# Patient Record
Sex: MALE | Race: WHITE
[De-identification: names, ages, dates, MRNs, and addresses within clinical notes are randomized per-mention and may not be internally consistent; named-entity substitution may affect disease eponyms.]

---

## 2022-12-27 ENCOUNTER — HOSPITAL ENCOUNTER (INPATIENT)
Dept: HOSPITAL 95 - ER | Age: 82
LOS: 2 days | Discharge: HOME HEALTH | DRG: 193 | End: 2022-12-29
Attending: HOSPITALIST | Admitting: HOSPITALIST
Payer: OTHER GOVERNMENT

## 2022-12-27 VITALS — WEIGHT: 203.93 LBS | BODY MASS INDEX: 29.19 KG/M2 | HEIGHT: 70 IN

## 2022-12-27 DIAGNOSIS — I12.9: ICD-10-CM

## 2022-12-27 DIAGNOSIS — I48.0: ICD-10-CM

## 2022-12-27 DIAGNOSIS — J70.1: ICD-10-CM

## 2022-12-27 DIAGNOSIS — J44.0: ICD-10-CM

## 2022-12-27 DIAGNOSIS — M19.90: ICD-10-CM

## 2022-12-27 DIAGNOSIS — Z87.891: ICD-10-CM

## 2022-12-27 DIAGNOSIS — J10.00: Primary | ICD-10-CM
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
## 2022-12-27 DIAGNOSIS — J96.01: ICD-10-CM

## 2022-12-27 DIAGNOSIS — N18.4: ICD-10-CM

## 2022-12-27 DIAGNOSIS — Z20.822: ICD-10-CM

## 2022-12-27 DIAGNOSIS — E66.9: ICD-10-CM

## 2022-12-27 DIAGNOSIS — C34.11: ICD-10-CM

## 2022-12-27 DIAGNOSIS — M10.9: ICD-10-CM

## 2022-12-27 DIAGNOSIS — G25.81: ICD-10-CM

## 2022-12-27 DIAGNOSIS — Z66: ICD-10-CM

## 2022-12-27 DIAGNOSIS — Z79.82: ICD-10-CM

## 2022-12-27 DIAGNOSIS — E78.5: ICD-10-CM

## 2022-12-27 LAB
ALBUMIN SERPL BCP-MCNC: 3 G/DL (ref 3.4–5)
ALBUMIN/GLOB SERPL: 0.8 {RATIO} (ref 0.8–1.8)
ALT SERPL W P-5'-P-CCNC: 11 U/L (ref 12–78)
ANION GAP SERPL CALCULATED.4IONS-SCNC: 5 MMOL/L (ref 6–16)
AST SERPL W P-5'-P-CCNC: 18 U/L (ref 12–37)
BASOPHILS # BLD AUTO: 0.02 K/MM3 (ref 0–0.23)
BASOPHILS NFR BLD AUTO: 0 % (ref 0–2)
BILIRUB SERPL-MCNC: 0.4 MG/DL (ref 0.1–1)
BUN SERPL-MCNC: 52 MG/DL (ref 8–24)
CALCIUM SERPL-MCNC: 8.3 MG/DL (ref 8.5–10.1)
CHLORIDE SERPL-SCNC: 114 MMOL/L (ref 98–108)
CO2 SERPL-SCNC: 23 MMOL/L (ref 21–32)
CREAT SERPL-MCNC: 3.4 MG/DL (ref 0.6–1.2)
DEPRECATED RDW RBC AUTO: 62.4 FL (ref 35.1–46.3)
EOSINOPHIL # BLD AUTO: 0.06 K/MM3 (ref 0–0.68)
EOSINOPHIL NFR BLD AUTO: 1 % (ref 0–6)
ERYTHROCYTE [DISTWIDTH] IN BLOOD BY AUTOMATED COUNT: 18.3 % (ref 11.7–14.2)
FLUAV RNA SPEC QL NAA+PROBE: POSITIVE
FLUBV RNA SPEC QL NAA+PROBE: NEGATIVE
GLOBULIN SER CALC-MCNC: 3.9 G/DL (ref 2.2–4)
GLUCOSE SERPL-MCNC: 127 MG/DL (ref 70–99)
HCT VFR BLD AUTO: 31.5 % (ref 37–53)
HGB BLD-MCNC: 9.4 G/DL (ref 13.5–17.5)
IMM GRANULOCYTES # BLD AUTO: 0.01 K/MM3 (ref 0–0.1)
IMM GRANULOCYTES NFR BLD AUTO: 0 % (ref 0–1)
LYMPHOCYTES # BLD AUTO: 1.12 K/MM3 (ref 0.84–5.2)
LYMPHOCYTES NFR BLD AUTO: 24 % (ref 21–46)
MCHC RBC AUTO-ENTMCNC: 29.8 G/DL (ref 31.5–36.5)
MCV RBC AUTO: 92 FL (ref 80–100)
MONOCYTES # BLD AUTO: 0.32 K/MM3 (ref 0.16–1.47)
MONOCYTES NFR BLD AUTO: 7 % (ref 4–13)
NEUTROPHILS # BLD AUTO: 3.17 K/MM3 (ref 1.96–9.15)
NEUTROPHILS NFR BLD AUTO: 68 % (ref 41–73)
NRBC # BLD AUTO: 0 K/MM3 (ref 0–0.02)
NRBC BLD AUTO-RTO: 0 /100 WBC (ref 0–0.2)
PLATELET # BLD AUTO: 172 K/MM3 (ref 150–400)
POTASSIUM SERPL-SCNC: 4.7 MMOL/L (ref 3.5–5.5)
PROT SERPL-MCNC: 6.9 G/DL (ref 6.4–8.2)
RSV RNA SPEC QL NAA+PROBE: NEGATIVE
SARS-COV-2 RNA RESP QL NAA+PROBE: NEGATIVE
SODIUM SERPL-SCNC: 142 MMOL/L (ref 136–145)

## 2022-12-27 PROCEDURE — G0008 ADMIN INFLUENZA VIRUS VAC: HCPCS

## 2022-12-27 PROCEDURE — G0378 HOSPITAL OBSERVATION PER HR: HCPCS

## 2022-12-27 PROCEDURE — A9270 NON-COVERED ITEM OR SERVICE: HCPCS

## 2022-12-28 LAB
ANION GAP SERPL CALCULATED.4IONS-SCNC: 6 MMOL/L (ref 6–16)
BASOPHILS # BLD AUTO: 0.02 K/MM3 (ref 0–0.23)
BASOPHILS NFR BLD AUTO: 1 % (ref 0–2)
BUN SERPL-MCNC: 49 MG/DL (ref 8–24)
CALCIUM SERPL-MCNC: 8.3 MG/DL (ref 8.5–10.1)
CHLORIDE SERPL-SCNC: 113 MMOL/L (ref 98–108)
CO2 SERPL-SCNC: 25 MMOL/L (ref 21–32)
CREAT SERPL-MCNC: 3.34 MG/DL (ref 0.6–1.2)
DEPRECATED RDW RBC AUTO: 59.7 FL (ref 35.1–46.3)
EOSINOPHIL # BLD AUTO: 0.05 K/MM3 (ref 0–0.68)
EOSINOPHIL NFR BLD AUTO: 1 % (ref 0–6)
ERYTHROCYTE [DISTWIDTH] IN BLOOD BY AUTOMATED COUNT: 18 % (ref 11.7–14.2)
GLUCOSE SERPL-MCNC: 108 MG/DL (ref 70–99)
HCT VFR BLD AUTO: 27.8 % (ref 37–53)
HGB BLD-MCNC: 8.5 G/DL (ref 13.5–17.5)
IMM GRANULOCYTES # BLD AUTO: 0.02 K/MM3 (ref 0–0.1)
IMM GRANULOCYTES NFR BLD AUTO: 1 % (ref 0–1)
LYMPHOCYTES # BLD AUTO: 0.95 K/MM3 (ref 0.84–5.2)
LYMPHOCYTES NFR BLD AUTO: 27 % (ref 21–46)
MCHC RBC AUTO-ENTMCNC: 30.6 G/DL (ref 31.5–36.5)
MCV RBC AUTO: 90 FL (ref 80–100)
MONOCYTES # BLD AUTO: 0.27 K/MM3 (ref 0.16–1.47)
MONOCYTES NFR BLD AUTO: 8 % (ref 4–13)
NEUTROPHILS # BLD AUTO: 2.27 K/MM3 (ref 1.96–9.15)
NEUTROPHILS NFR BLD AUTO: 63 % (ref 41–73)
NRBC # BLD AUTO: 0 K/MM3 (ref 0–0.02)
NRBC BLD AUTO-RTO: 0 /100 WBC (ref 0–0.2)
PLATELET # BLD AUTO: 177 K/MM3 (ref 150–400)
POTASSIUM SERPL-SCNC: 4.4 MMOL/L (ref 3.5–5.5)
SODIUM SERPL-SCNC: 144 MMOL/L (ref 136–145)

## 2022-12-28 NOTE — NUR
DAY SHIFT SUMMARY
 
PT ORIENTED X4, VSS PER PT TREND. ON 2-8L PER RT HOME EVAL. 2L AT REST AND 8L
WITH ACTIVITY. PLAN TO D/C PER DR. CROWELL BUT WITH O2 REQUIREMENT PREFERS ONE
MORE DAY OF INPATIENT AND REEVAL. UP SBA.
 
WILL PASS ON TO DORIE TOLBERT

## 2022-12-28 NOTE — NUR
SHIFT SUMMARY
PT ADMITTED AT 2250-REPORT FROM ED NURSE- PT A&O X 4, PT ON 2L O2- PT DENIES
SOB, PAIN- PT STANDS AT SIDE OF BED AND USES URINAL WITHOUT PROBLEM- PT ATE
SANDWICH WITHOUT C/O NAUSEA- PT TOOK SCHEDULED MEDS WITHOUT PROBLEMS- GAVE
INFLUENZA VACCINE PER PT REQUEST- PT SLEPT MOST OF NIGHT- BED LOW POSITION,
CALL LIGHT WITHIN REACH

## 2022-12-29 LAB
ALBUMIN SERPL BCP-MCNC: 2.6 G/DL (ref 3.4–5)
ANION GAP SERPL CALCULATED.4IONS-SCNC: 6 MMOL/L (ref 6–16)
BUN SERPL-MCNC: 52 MG/DL (ref 8–24)
CALCIUM SERPL-MCNC: 8.1 MG/DL (ref 8.5–10.1)
CHLORIDE SERPL-SCNC: 111 MMOL/L (ref 98–108)
CO2 SERPL-SCNC: 26 MMOL/L (ref 21–32)
CREAT SERPL-MCNC: 3.66 MG/DL (ref 0.6–1.2)
DEPRECATED RDW RBC AUTO: 58.9 FL (ref 35.1–46.3)
ERYTHROCYTE [DISTWIDTH] IN BLOOD BY AUTOMATED COUNT: 17.7 % (ref 11.7–14.2)
GLUCOSE SERPL-MCNC: 99 MG/DL (ref 70–99)
HCT VFR BLD AUTO: 27.4 % (ref 37–53)
HGB BLD-MCNC: 8.3 G/DL (ref 13.5–17.5)
MCHC RBC AUTO-ENTMCNC: 30.3 G/DL (ref 31.5–36.5)
MCV RBC AUTO: 90 FL (ref 80–100)
NRBC # BLD AUTO: 0 K/MM3 (ref 0–0.02)
NRBC BLD AUTO-RTO: 0 /100 WBC (ref 0–0.2)
PHOSPHATE SERPL-MCNC: 3.5 MG/DL (ref 2.5–4.9)
PLATELET # BLD AUTO: 190 K/MM3 (ref 150–400)
POTASSIUM SERPL-SCNC: 4.4 MMOL/L (ref 3.5–5.5)
SODIUM SERPL-SCNC: 143 MMOL/L (ref 136–145)

## 2022-12-29 NOTE — NUR
DISCHARGE REVIEWED WITH PT AND SON. IV PULLED INTACT. NO TELE. PT VERBALIZED
UNDERSTANDING MEDS AND INST. HAS O2 AT HOME AND ALSO PORTABLE IN HAND, PT
WHEELED TODOOR AT 1437

## 2022-12-29 NOTE — NUR
SHIFT SUMMARY
 
A&O X4. VSS. DENIES PAIN. PLEASANT AND COOPERATIVE WITH CARE. STANDBY ASSIST.
FAMILY AT BEDSIDE BEGINNING OF SHIFT. CURRENTLY ON 3L NC AT REST. BED ALARM
ON. WILL CONTINUE TO MONITOR AND FOLLOW PLAN OF CARE.

## 2023-07-13 ENCOUNTER — HOSPITAL ENCOUNTER (OUTPATIENT)
Dept: HOSPITAL 95 - LAB SHORT | Age: 83
Discharge: HOME | End: 2023-07-13
Attending: INTERNAL MEDICINE
Payer: OTHER GOVERNMENT

## 2023-07-13 DIAGNOSIS — R10.9: ICD-10-CM

## 2023-07-13 DIAGNOSIS — T85.71XA: ICD-10-CM

## 2023-07-13 DIAGNOSIS — N18.6: Primary | ICD-10-CM

## 2023-07-19 ENCOUNTER — HOSPITAL ENCOUNTER (INPATIENT)
Dept: HOSPITAL 95 - ER | Age: 83
LOS: 10 days | Discharge: HOME HEALTH | DRG: 907 | End: 2023-07-29
Attending: HOSPITALIST | Admitting: HOSPITALIST
Payer: OTHER GOVERNMENT

## 2023-07-19 VITALS — DIASTOLIC BLOOD PRESSURE: 64 MMHG | SYSTOLIC BLOOD PRESSURE: 135 MMHG

## 2023-07-19 VITALS — BODY MASS INDEX: 27.77 KG/M2 | WEIGHT: 194.01 LBS | HEIGHT: 70 IN

## 2023-07-19 DIAGNOSIS — Z79.82: ICD-10-CM

## 2023-07-19 DIAGNOSIS — Z79.899: ICD-10-CM

## 2023-07-19 DIAGNOSIS — K56.600: ICD-10-CM

## 2023-07-19 DIAGNOSIS — E86.0: ICD-10-CM

## 2023-07-19 DIAGNOSIS — Z85.118: ICD-10-CM

## 2023-07-19 DIAGNOSIS — G47.33: ICD-10-CM

## 2023-07-19 DIAGNOSIS — I48.0: ICD-10-CM

## 2023-07-19 DIAGNOSIS — A41.01: ICD-10-CM

## 2023-07-19 DIAGNOSIS — K65.1: ICD-10-CM

## 2023-07-19 DIAGNOSIS — Z71.6: ICD-10-CM

## 2023-07-19 DIAGNOSIS — K56.7: ICD-10-CM

## 2023-07-19 DIAGNOSIS — J44.9: ICD-10-CM

## 2023-07-19 DIAGNOSIS — T85.611A: ICD-10-CM

## 2023-07-19 DIAGNOSIS — M10.9: ICD-10-CM

## 2023-07-19 DIAGNOSIS — D75.838: ICD-10-CM

## 2023-07-19 DIAGNOSIS — E86.1: ICD-10-CM

## 2023-07-19 DIAGNOSIS — I12.9: ICD-10-CM

## 2023-07-19 DIAGNOSIS — K21.9: ICD-10-CM

## 2023-07-19 DIAGNOSIS — R80.0: ICD-10-CM

## 2023-07-19 DIAGNOSIS — M19.90: ICD-10-CM

## 2023-07-19 DIAGNOSIS — Z66: ICD-10-CM

## 2023-07-19 DIAGNOSIS — Z79.891: ICD-10-CM

## 2023-07-19 DIAGNOSIS — K91.89: ICD-10-CM

## 2023-07-19 DIAGNOSIS — Z99.89: ICD-10-CM

## 2023-07-19 DIAGNOSIS — D63.1: ICD-10-CM

## 2023-07-19 DIAGNOSIS — N18.5: ICD-10-CM

## 2023-07-19 DIAGNOSIS — T85.71XA: Primary | ICD-10-CM

## 2023-07-19 DIAGNOSIS — Z98.890: ICD-10-CM

## 2023-07-19 DIAGNOSIS — I25.10: ICD-10-CM

## 2023-07-19 DIAGNOSIS — E78.5: ICD-10-CM

## 2023-07-19 DIAGNOSIS — R18.8: ICD-10-CM

## 2023-07-19 DIAGNOSIS — K44.9: ICD-10-CM

## 2023-07-19 DIAGNOSIS — E87.0: ICD-10-CM

## 2023-07-19 DIAGNOSIS — G25.81: ICD-10-CM

## 2023-07-19 DIAGNOSIS — L03.311: ICD-10-CM

## 2023-07-19 DIAGNOSIS — Z87.891: ICD-10-CM

## 2023-07-19 DIAGNOSIS — K80.20: ICD-10-CM

## 2023-07-19 DIAGNOSIS — G47.00: ICD-10-CM

## 2023-07-19 DIAGNOSIS — Z99.2: ICD-10-CM

## 2023-07-19 DIAGNOSIS — R94.31: ICD-10-CM

## 2023-07-19 LAB
ALBUMIN SERPL BCP-MCNC: 2.9 G/DL (ref 3.4–5)
ALBUMIN/GLOB SERPL: 0.7 {RATIO} (ref 0.8–1.8)
ALT SERPL W P-5'-P-CCNC: 16 U/L (ref 12–78)
ANION GAP SERPL CALCULATED.4IONS-SCNC: 8 MMOL/L (ref 6–16)
AST SERPL W P-5'-P-CCNC: 15 U/L (ref 12–37)
BASOPHILS # BLD AUTO: 0.04 K/MM3 (ref 0–0.23)
BASOPHILS NFR BLD AUTO: 1 % (ref 0–2)
BILIRUB SERPL-MCNC: 0.3 MG/DL (ref 0.1–1)
BUN SERPL-MCNC: 55 MG/DL (ref 8–24)
CALCIUM SERPL-MCNC: 9.1 MG/DL (ref 8.5–10.1)
CHLORIDE SERPL-SCNC: 108 MMOL/L (ref 98–108)
CO2 SERPL-SCNC: 25 MMOL/L (ref 21–32)
CREAT SERPL-MCNC: 3.71 MG/DL (ref 0.6–1.2)
DEPRECATED RDW RBC AUTO: 59.8 FL (ref 35.1–46.3)
EOSINOPHIL # BLD AUTO: 0.16 K/MM3 (ref 0–0.68)
EOSINOPHIL NFR BLD AUTO: 2 % (ref 0–6)
ERYTHROCYTE [DISTWIDTH] IN BLOOD BY AUTOMATED COUNT: 17.9 % (ref 11.7–14.2)
GLOBULIN SER CALC-MCNC: 4.3 G/DL (ref 2.2–4)
GLUCOSE SERPL-MCNC: 134 MG/DL (ref 70–99)
HCT VFR BLD AUTO: 34.3 % (ref 37–53)
HGB BLD-MCNC: 10.3 G/DL (ref 13.5–17.5)
IMM GRANULOCYTES # BLD AUTO: 0.05 K/MM3 (ref 0–0.1)
IMM GRANULOCYTES NFR BLD AUTO: 1 % (ref 0–1)
LYMPHOCYTES # BLD AUTO: 1.54 K/MM3 (ref 0.84–5.2)
LYMPHOCYTES NFR BLD AUTO: 18 % (ref 21–46)
MCHC RBC AUTO-ENTMCNC: 30 G/DL (ref 31.5–36.5)
MCV RBC AUTO: 92 FL (ref 80–100)
MONOCYTES # BLD AUTO: 0.34 K/MM3 (ref 0.16–1.47)
MONOCYTES NFR BLD AUTO: 4 % (ref 4–13)
NEUTROPHILS # BLD AUTO: 6.52 K/MM3 (ref 1.96–9.15)
NEUTROPHILS NFR BLD AUTO: 75 % (ref 41–73)
NRBC # BLD AUTO: 0 K/MM3 (ref 0–0.02)
NRBC BLD AUTO-RTO: 0 /100 WBC (ref 0–0.2)
PLATELET # BLD AUTO: 450 K/MM3 (ref 150–400)
POTASSIUM SERPL-SCNC: 5 MMOL/L (ref 3.5–5.5)
PROT SERPL-MCNC: 7.2 G/DL (ref 6.4–8.2)
PROT UR STRIP-MCNC: (no result) MG/DL
PROTHROMBIN TIME: 10.4 SEC (ref 9.7–11.5)
RBC #/AREA URNS HPF: (no result) /HPF (ref 0–2)
SODIUM SERPL-SCNC: 141 MMOL/L (ref 136–145)
SP GR SPEC: 1.01 (ref 1–1.02)
UROBILINOGEN UR STRIP-MCNC: (no result) MG/DL
WBC #/AREA URNS HPF: (no result) /HPF (ref 0–5)

## 2023-07-19 PROCEDURE — A9270 NON-COVERED ITEM OR SERVICE: HCPCS

## 2023-07-19 PROCEDURE — C9113 INJ PANTOPRAZOLE SODIUM, VIA: HCPCS

## 2023-07-19 PROCEDURE — 3E03329 INTRODUCTION OF OTHER ANTI-INFECTIVE INTO PERIPHERAL VEIN, PERCUTANEOUS APPROACH: ICD-10-PCS | Performed by: HOSPITALIST

## 2023-07-19 PROCEDURE — G0378 HOSPITAL OBSERVATION PER HR: HCPCS

## 2023-07-19 NOTE — NUR
tele anna called and said pt having st depression, talked with pt and pt denied
any chest , left arm , jaw or back pain. pt denied any n/v but did say abd
still painfull not as much as when he came into ER but still painfull. told pt
to please call if any chest, jaw, left arm pain , back pain, or any nausea. pt
vu and stated he would call. Also spoke with charge Rn and he felt if pt not
having any symptoms to just observe. Also did fire safty ED with PT Vu and
denied having any lighting device.

## 2023-07-20 VITALS — SYSTOLIC BLOOD PRESSURE: 134 MMHG | DIASTOLIC BLOOD PRESSURE: 67 MMHG

## 2023-07-20 VITALS — SYSTOLIC BLOOD PRESSURE: 114 MMHG | DIASTOLIC BLOOD PRESSURE: 52 MMHG

## 2023-07-20 VITALS — SYSTOLIC BLOOD PRESSURE: 130 MMHG | DIASTOLIC BLOOD PRESSURE: 62 MMHG

## 2023-07-20 VITALS — DIASTOLIC BLOOD PRESSURE: 60 MMHG | SYSTOLIC BLOOD PRESSURE: 110 MMHG

## 2023-07-20 LAB
ALBUMIN SERPL BCP-MCNC: 2.4 G/DL (ref 3.4–5)
ANION GAP SERPL CALCULATED.4IONS-SCNC: 11 MMOL/L (ref 6–16)
BASOPHILS # BLD AUTO: 0.06 K/MM3 (ref 0–0.23)
BASOPHILS NFR BLD AUTO: 0 % (ref 0–2)
BUN SERPL-MCNC: 55 MG/DL (ref 8–24)
CALCIUM SERPL-MCNC: 8.4 MG/DL (ref 8.5–10.1)
CHLORIDE SERPL-SCNC: 109 MMOL/L (ref 98–108)
CO2 SERPL-SCNC: 23 MMOL/L (ref 21–32)
CREAT SERPL-MCNC: 4.04 MG/DL (ref 0.6–1.2)
DEPRECATED RDW RBC AUTO: 60.2 FL (ref 35.1–46.3)
EOSINOPHIL # BLD AUTO: 0.08 K/MM3 (ref 0–0.68)
EOSINOPHIL NFR BLD AUTO: 0 % (ref 0–6)
ERYTHROCYTE [DISTWIDTH] IN BLOOD BY AUTOMATED COUNT: 17.9 % (ref 11.7–14.2)
GLUCOSE SERPL-MCNC: 114 MG/DL (ref 70–99)
HCT VFR BLD AUTO: 33.1 % (ref 37–53)
HGB BLD-MCNC: 9.9 G/DL (ref 13.5–17.5)
IMM GRANULOCYTES # BLD AUTO: 0.14 K/MM3 (ref 0–0.1)
IMM GRANULOCYTES NFR BLD AUTO: 1 % (ref 0–1)
LYMPHOCYTES # BLD AUTO: 1.01 K/MM3 (ref 0.84–5.2)
LYMPHOCYTES NFR BLD AUTO: 4 % (ref 21–46)
MCHC RBC AUTO-ENTMCNC: 29.9 G/DL (ref 31.5–36.5)
MCV RBC AUTO: 91 FL (ref 80–100)
MONOCYTES # BLD AUTO: 0.57 K/MM3 (ref 0.16–1.47)
MONOCYTES NFR BLD AUTO: 2 % (ref 4–13)
NEUTROPHILS # BLD AUTO: 23.21 K/MM3 (ref 1.96–9.15)
NEUTROPHILS NFR BLD AUTO: 93 % (ref 41–73)
NRBC # BLD AUTO: 0 K/MM3 (ref 0–0.02)
NRBC BLD AUTO-RTO: 0 /100 WBC (ref 0–0.2)
PHOSPHATE SERPL-MCNC: 3.7 MG/DL (ref 2.5–4.9)
PLATELET # BLD AUTO: 486 K/MM3 (ref 150–400)
POTASSIUM SERPL-SCNC: 5 MMOL/L (ref 3.5–5.5)
SODIUM SERPL-SCNC: 143 MMOL/L (ref 136–145)
VANCOMYCIN SERPL-MCNC: 11.6 UG/ML

## 2023-07-20 NOTE — NUR
RECEIVED MESSAGE FROM [facility]: DR. VARELA WILL NOT BE IN
HOSPITAL UNTIL TUESDAY NEXT WEEK, SO PT'S PD CATHETER WILL NOT BE REMOVED
UNTIL THEN. DR. LUCAS CAME TO SEE PT THIS EVENING, REQUESTED THAT GEN SURG
REMOVE PD CATHETER SOONER RATHER THAN LATER. THIS AUTHOR SPOKE TO DR. CROWELL,
RELAYED THIS INFORMATION, REQUESTED [facility] CONSULT. PROVIDER STATED THAT SHE
SPOKE TO DR. VARELA AND HE IS WILLING TO DO PROCEDURE BUT IS NOT SURE WHEN
IT WILL HAPPEN. DR. CROWELL STATED THE PLAN WILL BE IV ABX AND NPO UNTIL DR. VARELA IS AVAILABLE TO REMOVE CATHETER.

## 2023-07-20 NOTE — NUR
SHIFT SUMMARY:
 
C/O 6/10 PAIN IN ABDOMEN, RELIEVED SLIGHTLY WITH BELCHING, RECEIVING IV
DILAUDID WITH SOME RELIEF ALSO. HAVING INTERMITTENT NAUSEA AND HAD SMALL AMT
BILIOUS EMESIS THIS AFTERNOON; MEDICATED WITH REGLAN. NO EVENTS ON TELEMETRY,
SR 80-90'S. WEARING O2 @ 3 L/MIN NC. IS NPO, BUT TOLERATING VERY SMALL AMT ICE
CHIPS. WBC INCREASED FROM 8K TO 25K, IS ON IV VANCO AND ROCEPHIN. ABD IS
DISTENDED, SLIGHTLY FIRM, TYMPANIC; IS NOT PASSING GAS AT THIS TIME. SYMPTOMS
REPORTED TO DR. CROWELL.

## 2023-07-20 NOTE — NUR
OXYGEN SAFETY EDUCATION:
 
PATIENT VERBALIZED UNDERSTANDING OF OXYGEN SAFETY EDUCATION. DOES NOT SMOKE,
IS CURRENTLY USING OXYGEN AT 3 L/MIN NC. HAS NO IGNITION DEVICES IN HIS
POSSESSION.

## 2023-07-20 NOTE — NUR
SHIFT SUMMERY, PT RESTING IN BED, PT MEDICATED FOR PAIN X2, PT GIVEN HEATING
PAD FOR ABD  PT HAVING PAIN  TO ABD.  PT DENIES ANY CHEST, ARM OR JAW PAIN NO
C/O N/V.

## 2023-07-20 NOTE — NUR
pt encorated to turn in bed to get up and ambulat to help the motility of his
bowels. pt turned to left side for a short time but stated it was too
uncomfortable to stay in that position. pt medicated for pain but still not
wanitng to try to go for a walk. pt had sm emiss that got on beding had pt sit
up while bed changed. pt stated he felt a little better while sittting up but
still not wanting to walk. pt back in bed trying to sleep. pts abd very
distended and tempainic. call light in reach.

## 2023-07-21 VITALS — DIASTOLIC BLOOD PRESSURE: 60 MMHG | SYSTOLIC BLOOD PRESSURE: 137 MMHG

## 2023-07-21 VITALS — DIASTOLIC BLOOD PRESSURE: 66 MMHG | SYSTOLIC BLOOD PRESSURE: 141 MMHG

## 2023-07-21 VITALS — DIASTOLIC BLOOD PRESSURE: 65 MMHG | SYSTOLIC BLOOD PRESSURE: 151 MMHG

## 2023-07-21 VITALS — SYSTOLIC BLOOD PRESSURE: 148 MMHG | DIASTOLIC BLOOD PRESSURE: 75 MMHG

## 2023-07-21 LAB
DEPRECATED RDW RBC AUTO: 58.4 FL (ref 35.1–46.3)
ERYTHROCYTE [DISTWIDTH] IN BLOOD BY AUTOMATED COUNT: 17.6 % (ref 11.7–14.2)
HCT VFR BLD AUTO: 30.8 % (ref 37–53)
HGB BLD-MCNC: 9.5 G/DL (ref 13.5–17.5)
MCHC RBC AUTO-ENTMCNC: 30.8 G/DL (ref 31.5–36.5)
MCV RBC AUTO: 91 FL (ref 80–100)
NRBC # BLD AUTO: 0 K/MM3 (ref 0–0.02)
NRBC BLD AUTO-RTO: 0 /100 WBC (ref 0–0.2)
PLATELET # BLD AUTO: 432 K/MM3 (ref 150–400)
VANCOMYCIN SERPL-MCNC: 15.7 UG/ML

## 2023-07-21 NOTE — NUR
SHIFT SUMMERY. PT HAD 4 VERY SM EMESIS ABOUT 10 CC AND 1 WITH ABOUT 50CC, PT
SEEMS TO BECOMMING MORE NAUSEATED A TIME GOES ON. PT SAT AT EDGE OF BED FOR A
SHORT TIME AND THEN REPOSITIONED IN BED. CALL LIGHT IN REACH.

## 2023-07-21 NOTE — NUR
SHIFT SUMMARY:
 
PT STATED ABD FEELS BETTER SINCE NGT PLACED. NO EVENTS ON TELEMETRY, SR
80-90'S. SQ HEPARIN HELD IN ANTICIPATION OF POSSIBLE PROCEDURE TODAY, SCD'S
ORDERED BUT PT REFUSED. NEW BRUISING NOTED ON BILATERAL ARMS, POSSIBLY FROM
COBAN AFTER BLOOD DRAWS. USING O2 @ 2-3 L/MIN NC PRN. ABD TTP, ROUNDED AND
FIRM, NO FLATUS. NOTIFIED BY LAB OF POSITIVE BLOOD CULTURE X 1, DRAWN
YESTERDAY. USING URINAL INDEPENDENLTY. DR. ORTA SAW PT THIS AFTERNOON; PLAN
IS FOR SURGERY TOMORROW TO REMOVE PD CATHETER.

## 2023-07-21 NOTE — NUR
pt has been having very sm emesis about 10 cc. pt was medicated for the second
time and pt up and ambulated about 20 feet then back to bed. pt alert and
oriented but very uncomfortable, pt had a fall a few weeks ago and fx some
ribs bilat and is uncomfortable laying on his sides. encoraging pt to turn and
move to help exspel gas. call light in reach.

## 2023-07-21 NOTE — NUR
OXYGEN SAFETY EDUCATION:
 
PATIENT VERBALIZED UNDERSTANDING OF OXYGEN SAFETY EDUCATION. NO IGNITION
DEVICES IN HIS POSSESSION. IS A FORMER SMOKER. CURRENTLY USING O2 @ 3 L/MIN
NC. WILL CONTINUE TO MONITOR.

## 2023-07-21 NOTE — NUR
EDUCATED PATIENT ABOUT PROCEDURE AND RATIONALE FOR PLACING NGT TO DECOMPRESS
STOMACH AND GIVE SOME RELIEF FROM NAUSE AND PRESSURE; VERBALIZED UNDERSTANDING
AND GAVE VERBAL CONSENT TO PROCEED. WALL SUCTION SET UP, IN GOOD WORKING
ORDER. SAT PT UP AT 60 DEGREES, GAVE ICE CHIPS TO AID SWALLOWING TUBE. LARGE
BORE NGT PLACED IN R NARES, LEVEL MARKED WITH BLACK MARKER (ARROW), SECURED
WITH STAT LOCK AFTER SILICONE SKIN PROTECTANT APPLIED. WHEN HOOKED UP TO LOW
SUCTION, 900 ML OF DARK GREEN OUTPUT OBTAINED IN A SHORT TIME. PT TOLERATED
PROCEDURE WELL, STATED HIS BELLY FEELS A LITTLE BETTER.

## 2023-07-22 VITALS — DIASTOLIC BLOOD PRESSURE: 62 MMHG | SYSTOLIC BLOOD PRESSURE: 126 MMHG

## 2023-07-22 VITALS — SYSTOLIC BLOOD PRESSURE: 127 MMHG | DIASTOLIC BLOOD PRESSURE: 56 MMHG

## 2023-07-22 VITALS — SYSTOLIC BLOOD PRESSURE: 132 MMHG | DIASTOLIC BLOOD PRESSURE: 61 MMHG

## 2023-07-22 VITALS — SYSTOLIC BLOOD PRESSURE: 129 MMHG | DIASTOLIC BLOOD PRESSURE: 68 MMHG

## 2023-07-22 VITALS — DIASTOLIC BLOOD PRESSURE: 58 MMHG | SYSTOLIC BLOOD PRESSURE: 128 MMHG

## 2023-07-22 VITALS — DIASTOLIC BLOOD PRESSURE: 60 MMHG | SYSTOLIC BLOOD PRESSURE: 149 MMHG

## 2023-07-22 VITALS — DIASTOLIC BLOOD PRESSURE: 56 MMHG | SYSTOLIC BLOOD PRESSURE: 134 MMHG

## 2023-07-22 VITALS — SYSTOLIC BLOOD PRESSURE: 132 MMHG | DIASTOLIC BLOOD PRESSURE: 59 MMHG

## 2023-07-22 VITALS — SYSTOLIC BLOOD PRESSURE: 117 MMHG | DIASTOLIC BLOOD PRESSURE: 64 MMHG

## 2023-07-22 VITALS — SYSTOLIC BLOOD PRESSURE: 136 MMHG | DIASTOLIC BLOOD PRESSURE: 52 MMHG

## 2023-07-22 VITALS — DIASTOLIC BLOOD PRESSURE: 63 MMHG | SYSTOLIC BLOOD PRESSURE: 145 MMHG

## 2023-07-22 VITALS — DIASTOLIC BLOOD PRESSURE: 81 MMHG | SYSTOLIC BLOOD PRESSURE: 139 MMHG

## 2023-07-22 VITALS — DIASTOLIC BLOOD PRESSURE: 60 MMHG | SYSTOLIC BLOOD PRESSURE: 121 MMHG

## 2023-07-22 VITALS — SYSTOLIC BLOOD PRESSURE: 132 MMHG | DIASTOLIC BLOOD PRESSURE: 63 MMHG

## 2023-07-22 VITALS — SYSTOLIC BLOOD PRESSURE: 138 MMHG | DIASTOLIC BLOOD PRESSURE: 63 MMHG

## 2023-07-22 VITALS — SYSTOLIC BLOOD PRESSURE: 137 MMHG | DIASTOLIC BLOOD PRESSURE: 55 MMHG

## 2023-07-22 VITALS — DIASTOLIC BLOOD PRESSURE: 62 MMHG | SYSTOLIC BLOOD PRESSURE: 133 MMHG

## 2023-07-22 VITALS — DIASTOLIC BLOOD PRESSURE: 59 MMHG | SYSTOLIC BLOOD PRESSURE: 119 MMHG

## 2023-07-22 VITALS — SYSTOLIC BLOOD PRESSURE: 130 MMHG | DIASTOLIC BLOOD PRESSURE: 74 MMHG

## 2023-07-22 VITALS — DIASTOLIC BLOOD PRESSURE: 56 MMHG | SYSTOLIC BLOOD PRESSURE: 142 MMHG

## 2023-07-22 LAB
ALBUMIN SERPL BCP-MCNC: 2.2 G/DL (ref 3.4–5)
ANION GAP SERPL CALCULATED.4IONS-SCNC: 10 MMOL/L (ref 6–16)
BUN SERPL-MCNC: 70 MG/DL (ref 8–24)
CALCIUM SERPL-MCNC: 8.4 MG/DL (ref 8.5–10.1)
CHLORIDE SERPL-SCNC: 111 MMOL/L (ref 98–108)
CO2 SERPL-SCNC: 23 MMOL/L (ref 21–32)
CREAT SERPL-MCNC: 3.97 MG/DL (ref 0.6–1.2)
DEPRECATED RDW RBC AUTO: 59 FL (ref 35.1–46.3)
ERYTHROCYTE [DISTWIDTH] IN BLOOD BY AUTOMATED COUNT: 17.6 % (ref 11.7–14.2)
GLUCOSE SERPL-MCNC: 101 MG/DL (ref 70–99)
HCT VFR BLD AUTO: 29.7 % (ref 37–53)
HGB BLD-MCNC: 8.9 G/DL (ref 13.5–17.5)
MCHC RBC AUTO-ENTMCNC: 30 G/DL (ref 31.5–36.5)
MCV RBC AUTO: 91 FL (ref 80–100)
NRBC # BLD AUTO: 0 K/MM3 (ref 0–0.02)
NRBC BLD AUTO-RTO: 0 /100 WBC (ref 0–0.2)
PHOSPHATE SERPL-MCNC: 4.2 MG/DL (ref 2.5–4.9)
PLATELET # BLD AUTO: 439 K/MM3 (ref 150–400)
POTASSIUM SERPL-SCNC: 4.4 MMOL/L (ref 3.5–5.5)
SODIUM SERPL-SCNC: 144 MMOL/L (ref 136–145)
VANCOMYCIN SERPL-MCNC: 20.6 UG/ML

## 2023-07-22 PROCEDURE — 0W9G0ZZ DRAINAGE OF PERITONEAL CAVITY, OPEN APPROACH: ICD-10-PCS | Performed by: SURGERY

## 2023-07-22 PROCEDURE — 0DJW4ZZ INSPECTION OF PERITONEUM, PERCUTANEOUS ENDOSCOPIC APPROACH: ICD-10-PCS | Performed by: SURGERY

## 2023-07-22 PROCEDURE — 0WPG03Z REMOVAL OF INFUSION DEVICE FROM PERITONEAL CAVITY, OPEN APPROACH: ICD-10-PCS | Performed by: SURGERY

## 2023-07-22 NOTE — NUR
PCU TELE TECH REPORTS ST DEPRESSION -4.5, DR. CROWELL NOTIFIED, EKG DONE, DR. CROWELL AWARE OF RESULTS, PT DENEIS ANY CHEST PAIN,SOB OR ANY DISCOMFORT OTHER
THAN INCISIONAL PAIN, CONT. TO MONITOR FOR ANY CHANGES.

## 2023-07-22 NOTE — NUR
PT REPORTS HAVING INADEQUATE PAIN CONTROL, RATES PAIN AT 6-7/10, DR. CROWELL
NOTIFIED, ADDITIONAL DOSE OF DILAUDID 0.5MG IV GIVEN, PT NOW RESTING
COMFORTABLY IN BED, KPAD TO ABD FOR ADDITIONAL PAIN RELIEF, FAMILY AT BEDSIDE,
CONT. TO MONITOR FOR ANY CHANGES.

## 2023-07-22 NOTE — NUR
surgery
PT TRANDFERED VIA GURNEY TO DAY SURGERY FOR PLANNED PROCEDURE. NG TUBE
DRAINING GREEN FLUID. IVF STOPPED. PT A/O X3 WITHOUT COMPLAINT. FAMILY CALLED
PER PT REQUEST, CONTINUE POC.

## 2023-07-22 NOTE — NUR
TRANSFER TO  POST OP. REPORT CALLED TO DURAN TOLBERT. FAMILY NOTOFOED OF
TRNSFER AND ROOM ASSIGNEMENT. CONTINUE POC.

## 2023-07-22 NOTE — NUR
SHIFT SUMMARY
ADMITTED FOR ABDOMINAL CELLULITIS. DNR CODE. SURGICAL CONSULT DR. ORTA PLANS
FOR PERITONEAL DIALYSIS CATH REMOVAL TODAY. NG TUBE IN PLACE ON LOW
INTERMITTENT SUCTION. IV FLUIDS INFUSING AS ORDERED. PT IS NPO. MEDICATION
GIVEN THIS SHIFT TO HELP WITH INSOMNIA. IV ANTIB RX ARE SCHEDULED. PAIN
MEDICATION GIVEN THIS SHIFT. HE DENIES N/V. NO BM OR GAS THIS SHIFT. FIRE
SAFETY AND IGNITION RISK ASSESSED AND DISCUSSED WITH PT.

## 2023-07-22 NOTE — NUR
PT ASSISTED TO RECLINER CHAIR, REPORTS PAIN IS "BETTER" ABD BINDER PLACED,
DENIES ANY NAUSEA, NGT CONT. TO DRAIN GREEN LIQUID, PT OK TO HAVE SMALL
AMOUNTS OF ICE CHIPS PER DR. ORTA, NO OTHER CHANGES THIS SHIFT.

## 2023-07-23 VITALS — DIASTOLIC BLOOD PRESSURE: 62 MMHG | SYSTOLIC BLOOD PRESSURE: 155 MMHG

## 2023-07-23 VITALS — DIASTOLIC BLOOD PRESSURE: 71 MMHG | SYSTOLIC BLOOD PRESSURE: 146 MMHG

## 2023-07-23 VITALS — SYSTOLIC BLOOD PRESSURE: 139 MMHG | DIASTOLIC BLOOD PRESSURE: 62 MMHG

## 2023-07-23 VITALS — DIASTOLIC BLOOD PRESSURE: 66 MMHG | SYSTOLIC BLOOD PRESSURE: 118 MMHG

## 2023-07-23 LAB
ALBUMIN SERPL BCP-MCNC: 2.1 G/DL (ref 3.4–5)
ANION GAP SERPL CALCULATED.4IONS-SCNC: 9 MMOL/L (ref 6–16)
BASOPHILS # BLD AUTO: 0.01 K/MM3 (ref 0–0.23)
BASOPHILS NFR BLD AUTO: 0 % (ref 0–2)
BUN SERPL-MCNC: 69 MG/DL (ref 8–24)
CALCIUM SERPL-MCNC: 7.7 MG/DL (ref 8.5–10.1)
CHLORIDE SERPL-SCNC: 113 MMOL/L (ref 98–108)
CO2 SERPL-SCNC: 21 MMOL/L (ref 21–32)
CREAT SERPL-MCNC: 3.7 MG/DL (ref 0.6–1.2)
DEPRECATED RDW RBC AUTO: 58.6 FL (ref 35.1–46.3)
EOSINOPHIL # BLD AUTO: 0 K/MM3 (ref 0–0.68)
EOSINOPHIL NFR BLD AUTO: 0 % (ref 0–6)
ERYTHROCYTE [DISTWIDTH] IN BLOOD BY AUTOMATED COUNT: 17.4 % (ref 11.7–14.2)
GLUCOSE SERPL-MCNC: 125 MG/DL (ref 70–99)
HCT VFR BLD AUTO: 30.6 % (ref 37–53)
HGB BLD-MCNC: 9.3 G/DL (ref 13.5–17.5)
IMM GRANULOCYTES # BLD AUTO: 0.07 K/MM3 (ref 0–0.1)
IMM GRANULOCYTES NFR BLD AUTO: 1 % (ref 0–1)
LYMPHOCYTES # BLD AUTO: 0.57 K/MM3 (ref 0.84–5.2)
LYMPHOCYTES NFR BLD AUTO: 4 % (ref 21–46)
MCHC RBC AUTO-ENTMCNC: 30.4 G/DL (ref 31.5–36.5)
MCV RBC AUTO: 92 FL (ref 80–100)
MONOCYTES # BLD AUTO: 0.61 K/MM3 (ref 0.16–1.47)
MONOCYTES NFR BLD AUTO: 4 % (ref 4–13)
NEUTROPHILS # BLD AUTO: 13.05 K/MM3 (ref 1.96–9.15)
NEUTROPHILS NFR BLD AUTO: 91 % (ref 41–73)
NRBC # BLD AUTO: 0 K/MM3 (ref 0–0.02)
NRBC BLD AUTO-RTO: 0 /100 WBC (ref 0–0.2)
PHOSPHATE SERPL-MCNC: 5.1 MG/DL (ref 2.5–4.9)
PLATELET # BLD AUTO: 457 K/MM3 (ref 150–400)
POTASSIUM SERPL-SCNC: 4.7 MMOL/L (ref 3.5–5.5)
SODIUM SERPL-SCNC: 143 MMOL/L (ref 136–145)
VANCOMYCIN SERPL-MCNC: 15.2 UG/ML

## 2023-07-23 NOTE — NUR
SUMMARY
PT FEELING "MUCH BETTER" TODAY, REPORTS HAVING ADEQUATE PAIN CONTROL WITH
DILAUDID PCA, DENIES ANY NAUSEA, PT ENCOURAGED TO USE IS, ABD STILL MODERATELY
DISTENDED, LARGE AMOUNTS OF GREEN LIQUID DRAINING OUT OF NGT, ACTIVE BT'S X4,
OOB TO CHAIR TODAY X2 AND AMBULATED DOWN THE [facility] WITH 2 PERSON ASSIST DUE TO
WEAKNESS AND LINES, PT STATES HE USES A WALKER AT BASELINE, ABD BINDER FOR
COMFORT AND SUPPORT, ABD DRESSINGS C/D/I, DENIES PASSING FLATUS, NO ACUTE
CHANGES THIS SHIFT.

## 2023-07-23 NOTE — NUR
FIRE SAFETY ASSESSMENT AND EDUCATION DONE, VERBALIZED UNDERSTANDING, PT DENIES
HX OF SMOKING, NO IGNITION SOURCES IDENTIFIED, PT DENIES HAVING ANY IGNITION
SOURCES IN ROOM.

## 2023-07-23 NOTE — NUR
SHIFT SUMMARY
 
NO ACUTE CHANGES TO REPORT OVERNIGHT. POD 0 PD PORT REMOVAL.  PT HAS RESTED
WELL. HE REPORTS ADEQUATE PAIN RELIEF WITH DILAUDID PCA. INCISION SITE WNL. NG
TUBE IN PLACE WITH WITH DARK GREEN DRAINAGE. VITALS STABLE. DILLON IN PLACE
PATENT AND DRAINING. BED IN LOWEST POSITION, CALL LIGHT WITHIN REACH.

## 2023-07-24 VITALS — DIASTOLIC BLOOD PRESSURE: 54 MMHG | SYSTOLIC BLOOD PRESSURE: 155 MMHG

## 2023-07-24 VITALS — SYSTOLIC BLOOD PRESSURE: 102 MMHG | DIASTOLIC BLOOD PRESSURE: 85 MMHG

## 2023-07-24 VITALS — SYSTOLIC BLOOD PRESSURE: 153 MMHG | DIASTOLIC BLOOD PRESSURE: 56 MMHG

## 2023-07-24 VITALS — DIASTOLIC BLOOD PRESSURE: 61 MMHG | SYSTOLIC BLOOD PRESSURE: 152 MMHG

## 2023-07-24 VITALS — DIASTOLIC BLOOD PRESSURE: 62 MMHG | SYSTOLIC BLOOD PRESSURE: 152 MMHG

## 2023-07-24 LAB
ALBUMIN SERPL BCP-MCNC: 2.1 G/DL (ref 3.4–5)
ANION GAP SERPL CALCULATED.4IONS-SCNC: 9 MMOL/L (ref 6–16)
BUN SERPL-MCNC: 67 MG/DL (ref 8–24)
CALCIUM SERPL-MCNC: 7.6 MG/DL (ref 8.5–10.1)
CHLORIDE SERPL-SCNC: 117 MMOL/L (ref 98–108)
CO2 SERPL-SCNC: 21 MMOL/L (ref 21–32)
CREAT SERPL-MCNC: 3.28 MG/DL (ref 0.6–1.2)
GLUCOSE SERPL-MCNC: 91 MG/DL (ref 70–99)
MAGNESIUM SERPL-MCNC: 1.6 MG/DL (ref 1.6–2.4)
PHOSPHATE SERPL-MCNC: 4.2 MG/DL (ref 2.5–4.9)
POTASSIUM SERPL-SCNC: 4.3 MMOL/L (ref 3.5–5.5)
SODIUM SERPL-SCNC: 147 MMOL/L (ref 136–145)
VANCOMYCIN SERPL-MCNC: 19.1 UG/ML

## 2023-07-24 NOTE — NUR
SHIFT SUMMARY
PT A&OX4 AND COOPERATIVE WITH CARE. PAIN MANAGED WITH PCA. NG IN PLACE
DRAINING GREEN/LIQUID, NO NAUSEA. DILLON IN PLACE DRAINING YELLOW/CLEAR. LAP
SITES C/D/I, OZZY DRESSING WITH SMALL AMOUNT OF DRIED BLOOD. CALLS
APPROPRIATELY, CALL LIGHT WITHIN REACH.

## 2023-07-24 NOTE — NUR
SHIFT SUMMARY
PT TOLERATING ICE CHIPS WELL. MINIMAL OUTPUT FROM NGT TODAY. STILL DARK GREEN
IN APPEARANCE. PT DENIES PAIN. DURING SHIFT. WILMA REMOVED THIS AM, PT HAS
VOIDED SINCE. IV ABX PER EMAR. MIDLINE INCISIONS REMAINS UNCHANGED DURING
SHIFT.

## 2023-07-24 NOTE — NUR
TELE EVENT
PT HAD AN 18 BEAT RUN OF V-TACH @ 0122. PT WAS ASYMPTOMATIC, NO COMPLAINTS
OF SOB, CHEST PAIN, OR PRESSURE. VITALS STABLE. CALL PLACED TO ,
NO NEW ORDERS AT THIS TIME, WILL CONTINUE TO MONITOR.

## 2023-07-25 VITALS — SYSTOLIC BLOOD PRESSURE: 155 MMHG | DIASTOLIC BLOOD PRESSURE: 63 MMHG

## 2023-07-25 VITALS — SYSTOLIC BLOOD PRESSURE: 158 MMHG | DIASTOLIC BLOOD PRESSURE: 65 MMHG

## 2023-07-25 VITALS — SYSTOLIC BLOOD PRESSURE: 145 MMHG | DIASTOLIC BLOOD PRESSURE: 59 MMHG

## 2023-07-25 VITALS — SYSTOLIC BLOOD PRESSURE: 167 MMHG | DIASTOLIC BLOOD PRESSURE: 67 MMHG

## 2023-07-25 LAB
ALBUMIN SERPL BCP-MCNC: 2.1 G/DL (ref 3.4–5)
ANION GAP SERPL CALCULATED.4IONS-SCNC: 15 MMOL/L (ref 6–16)
BUN SERPL-MCNC: 61 MG/DL (ref 8–24)
CALCIUM SERPL-MCNC: 7.9 MG/DL (ref 8.5–10.1)
CHLORIDE SERPL-SCNC: 116 MMOL/L (ref 98–108)
CO2 SERPL-SCNC: 16 MMOL/L (ref 21–32)
CREAT SERPL-MCNC: 2.89 MG/DL (ref 0.6–1.2)
GLUCOSE SERPL-MCNC: 73 MG/DL (ref 70–99)
PHOSPHATE SERPL-MCNC: 3.6 MG/DL (ref 2.5–4.9)
POTASSIUM SERPL-SCNC: 4.2 MMOL/L (ref 3.5–5.5)
SODIUM SERPL-SCNC: 147 MMOL/L (ref 136–145)
VANCOMYCIN SERPL-MCNC: 19.6 UG/ML

## 2023-07-25 NOTE — NUR
SHIFT SUMMARY
PT A&OX4 AND COOPERATIVE WITH CARE. NO ACUTE CHANGES. PAIN MANAGED WITH PCA.
PT NPO. NG SET TO LOW INT SUCTION, GREEN OUTPUT. PT VOIDING USING BEDSIDE
URINAL. LAP SITE DRESSINGS C/D/I. OZZY WITH LIGHT DRIED DRAINAGE. CALLS
APPROPRIATELY, CALL LIGHT WITHIN REACH.

## 2023-07-25 NOTE — NUR
NGT CLAMPED AT 0730 THIS MORNING. PT DENIES NAUSEA. ORAL PAIN MEDICATIONS
ORDERED. ABLE TO TOLERATE CLEAR LIQUIDS. PT REPORTS MINIMAL PAIN AT THIS TIME,
HE IS VERY EAGER TO GO ON A WALK THIS MORNING.

## 2023-07-25 NOTE — NUR
SHIFT [facility]
PT UP AND AMBULATING TODAY DURING THE SHIFT. PT EAGER TO AMBULATE. VOIDING
WITH URINAL. PAIN CONTROLLED WITH PO MEDICATIONS. DENIES NAUSEA DURING SHIFT.
TOLERATING LIQUIDS WELL. DRESSINGS REMAIN UNCHANGED. PLAN IS TO CONTINUE TO
ENCOURAGE AMBULATION AND GETTING UP TO CHAIR.

## 2023-07-25 NOTE — NUR
PT REPORTED PASSING GAS. STATES PAIN IS MUCH IMPROVED. NGT REMOVED BY DR. ORTA. ADVANCED TO FULL LIQUID DIET.

## 2023-07-26 VITALS — DIASTOLIC BLOOD PRESSURE: 53 MMHG | SYSTOLIC BLOOD PRESSURE: 151 MMHG

## 2023-07-26 VITALS — DIASTOLIC BLOOD PRESSURE: 65 MMHG | SYSTOLIC BLOOD PRESSURE: 130 MMHG

## 2023-07-26 VITALS — DIASTOLIC BLOOD PRESSURE: 55 MMHG | SYSTOLIC BLOOD PRESSURE: 148 MMHG

## 2023-07-26 VITALS — DIASTOLIC BLOOD PRESSURE: 57 MMHG | SYSTOLIC BLOOD PRESSURE: 162 MMHG

## 2023-07-26 LAB
ALBUMIN SERPL BCP-MCNC: 1.9 G/DL (ref 3.4–5)
ANION GAP SERPL CALCULATED.4IONS-SCNC: 14 MMOL/L (ref 6–16)
BASOPHILS # BLD AUTO: 0.05 K/MM3 (ref 0–0.23)
BASOPHILS NFR BLD AUTO: 1 % (ref 0–2)
BUN SERPL-MCNC: 58 MG/DL (ref 8–24)
CALCIUM SERPL-MCNC: 7.9 MG/DL (ref 8.5–10.1)
CHLORIDE SERPL-SCNC: 116 MMOL/L (ref 98–108)
CO2 SERPL-SCNC: 16 MMOL/L (ref 21–32)
CREAT SERPL-MCNC: 2.87 MG/DL (ref 0.6–1.2)
DEPRECATED RDW RBC AUTO: 58.1 FL (ref 35.1–46.3)
EOSINOPHIL # BLD AUTO: 0.21 K/MM3 (ref 0–0.68)
EOSINOPHIL NFR BLD AUTO: 2 % (ref 0–6)
ERYTHROCYTE [DISTWIDTH] IN BLOOD BY AUTOMATED COUNT: 17.3 % (ref 11.7–14.2)
GLUCOSE SERPL-MCNC: 112 MG/DL (ref 70–99)
HCT VFR BLD AUTO: 27.6 % (ref 37–53)
HGB BLD-MCNC: 8.3 G/DL (ref 13.5–17.5)
IMM GRANULOCYTES # BLD AUTO: 0.4 K/MM3 (ref 0–0.1)
IMM GRANULOCYTES NFR BLD AUTO: 4 % (ref 0–1)
LYMPHOCYTES # BLD AUTO: 1.03 K/MM3 (ref 0.84–5.2)
LYMPHOCYTES NFR BLD AUTO: 10 % (ref 21–46)
MCHC RBC AUTO-ENTMCNC: 30.1 G/DL (ref 31.5–36.5)
MCV RBC AUTO: 92 FL (ref 80–100)
MONOCYTES # BLD AUTO: 0.65 K/MM3 (ref 0.16–1.47)
MONOCYTES NFR BLD AUTO: 6 % (ref 4–13)
NEUTROPHILS # BLD AUTO: 8.2 K/MM3 (ref 1.96–9.15)
NEUTROPHILS NFR BLD AUTO: 78 % (ref 41–73)
NRBC # BLD AUTO: 0.03 K/MM3 (ref 0–0.02)
NRBC BLD AUTO-RTO: 0.3 /100 WBC (ref 0–0.2)
PHOSPHATE SERPL-MCNC: 3.1 MG/DL (ref 2.5–4.9)
PLATELET # BLD AUTO: 403 K/MM3 (ref 150–400)
POTASSIUM SERPL-SCNC: 3.9 MMOL/L (ref 3.5–5.5)
SODIUM SERPL-SCNC: 146 MMOL/L (ref 136–145)
VANCOMYCIN SERPL-MCNC: 23.1 UG/ML

## 2023-07-26 NOTE — NUR
SUMMARY: POD4 EX LAP. A/O, VSS, TELE WNL.  OZZY DRESSING WNL. LAP SITE AT RLQ
DRAINING MODERATE AMOUNT OF CLEAR FLUID. THIS RN NOTED THAT A STAPLE HAD
FALLEN OFF THE LAP SITE, 1 REMAINED IN PLACE. DR. ORTA MADE AWARE. OK TO USE
GAUZE OR ABD TO DRESS SITE. NO REDNESS OR SWELLING NOTED. PT DOING WELL WITH 1
NARCO FOR PAIN.  REPORTS PASSING GAS, TOLERATING FULL LIQ DIET. NO SAFETY
CONCERNS.

## 2023-07-26 NOTE — NUR
SHIFT SUMMARY
 
PT RESTED WELL T/O NIGHT. 1 NORCO FOR PAIN PRN. WOODY FULL LIQS. CONTINUES TO
REPORT FLATUS. OZZY DRESSING REMAINS COMPRESSED. RLQ LAP SITE WITH MODERATE
SEROUS DRAINAGE, DRESSING CHANGED X1. ABD STILL MODERATELY DISTENDED. USING
URINAL TO VOID. IV ABX PER ORDERS.  TELE REMAINS SR IN THE 70S. USES CALL
LIGHT APPROPRIATELY.

## 2023-07-27 VITALS — DIASTOLIC BLOOD PRESSURE: 57 MMHG | SYSTOLIC BLOOD PRESSURE: 148 MMHG

## 2023-07-27 VITALS — SYSTOLIC BLOOD PRESSURE: 150 MMHG | DIASTOLIC BLOOD PRESSURE: 60 MMHG

## 2023-07-27 VITALS — DIASTOLIC BLOOD PRESSURE: 77 MMHG | SYSTOLIC BLOOD PRESSURE: 126 MMHG

## 2023-07-27 VITALS — DIASTOLIC BLOOD PRESSURE: 64 MMHG | SYSTOLIC BLOOD PRESSURE: 156 MMHG

## 2023-07-27 LAB
ALBUMIN SERPL BCP-MCNC: 1.7 G/DL (ref 3.4–5)
ANION GAP SERPL CALCULATED.4IONS-SCNC: 7 MMOL/L (ref 6–16)
BUN SERPL-MCNC: 48 MG/DL (ref 8–24)
CALCIUM SERPL-MCNC: 7.6 MG/DL (ref 8.5–10.1)
CHLORIDE SERPL-SCNC: 117 MMOL/L (ref 98–108)
CO2 SERPL-SCNC: 21 MMOL/L (ref 21–32)
CREAT SERPL-MCNC: 2.5 MG/DL (ref 0.6–1.2)
GLUCOSE SERPL-MCNC: 120 MG/DL (ref 70–99)
PHOSPHATE SERPL-MCNC: 2.4 MG/DL (ref 2.5–4.9)
POTASSIUM SERPL-SCNC: 3.7 MMOL/L (ref 3.5–5.5)
SODIUM SERPL-SCNC: 145 MMOL/L (ref 136–145)

## 2023-07-27 NOTE — NUR
SHIFT SUMMARY
PT A&OX4, VSS/2LNC, VOIDING/URINAL, AMB SBA FWW/GB AND UP TO CHAIR FOR 1 MEAL,
PASSING LARGE AMOUNT OF FLATUS, WOODY PO FLD, PAIN MANAGED WITH NORCO 5 MG X 3,
TELE DC'D.  WILL REPORT TO ONCOMING NOC RN.

## 2023-07-27 NOTE — NUR
SUMMARY
MED TONIGHT PO FOR PAIN.PT REPORTS WAS PASSING FLATUS, BUT HAS SLOWED OVER
LAST 12 HR.ENIES NAUSEA. AB DISTENDED.VOIDING WIHOUT DIFF.PT FEELS HE IS
EMPTYING BLADDER FULLY.

## 2023-07-28 VITALS — DIASTOLIC BLOOD PRESSURE: 53 MMHG | SYSTOLIC BLOOD PRESSURE: 134 MMHG

## 2023-07-28 VITALS — SYSTOLIC BLOOD PRESSURE: 134 MMHG | DIASTOLIC BLOOD PRESSURE: 57 MMHG

## 2023-07-28 VITALS — DIASTOLIC BLOOD PRESSURE: 62 MMHG | SYSTOLIC BLOOD PRESSURE: 138 MMHG

## 2023-07-28 VITALS — SYSTOLIC BLOOD PRESSURE: 156 MMHG | DIASTOLIC BLOOD PRESSURE: 68 MMHG

## 2023-07-28 NOTE — NUR
SHIFT SUMMARY
PT A&OX4, VSS/2LNC, VOIDING/URINAL, AMB SBA FWW/GB AND UP TO CHAIR FOR 2 MEALS
PASSING LARGE AMOUNT OF FLATUS/MIRALAX GIVEN, WOODY PO REG DIET, PAIN MANAGED
WITH NORCO 5 MG PRN.  WILL REPORT TO ONCOMING NOC RN.

## 2023-07-29 VITALS — SYSTOLIC BLOOD PRESSURE: 133 MMHG | DIASTOLIC BLOOD PRESSURE: 58 MMHG

## 2023-07-29 VITALS — DIASTOLIC BLOOD PRESSURE: 89 MMHG | SYSTOLIC BLOOD PRESSURE: 133 MMHG

## 2023-07-29 VITALS — SYSTOLIC BLOOD PRESSURE: 134 MMHG | DIASTOLIC BLOOD PRESSURE: 58 MMHG

## 2023-07-29 NOTE — NUR
SHIFT SUMMARY
POD7 PT CATH REMOVAL, A/OX4, VSS, TOLERATING DIET, PAIN WELL MANAGED,
TRANSFERS WITH SBA, STABLE ON RA. DISCUSSED DISCHARGE INFORMATION WITH THE
PATIENT INCLUDING HOME CARE, DRESSING CHANGES PROVIDING HIM WITH EXTRA
DRESSINGS, MEDICATIONS, AND FOLLOW UP APPOINTMENTS. PT HAD NO QUESTIONS AT
THIS TIME. IV ACCESS REMOVED. ESCORTED OUT VIA WC TO [employer]IVATE [employer] TO GO HOME
WITH HIS DAUGHTER.

## 2023-07-29 NOTE — NUR
SHIFT SUMMARY
AOX4. VSS. POD 7-PD CATH REMOVAL, LYSIS OF ADHESIONS & DRAINAGE OF ABCESS.
MIDLINE OZZY DRESSING C/D/I, COMPRESSED c GREEN LIGHT ON. REPORTS PASSING LOTS
OF FLATUS, HAVING 4/10 ABD CRAMPING & INCREASE IN NAUSEA TODAY COMPARED TO
PREVIOUS DAYS, NO EMESIS, NO BM. PRESSURE TAPE TO RLQ INTACT. CALL LIGHT IN
REACH & PT ABLE TO MAKE NEEDS KNOWN. CALL LIGHT IN REACH, WILL MONITOR.

## 2024-11-15 ENCOUNTER — HOSPITAL ENCOUNTER (INPATIENT)
Dept: HOSPITAL 95 - ER | Age: 84
LOS: 7 days | Discharge: HOME | DRG: 180 | End: 2024-11-22
Attending: FAMILY MEDICINE | Admitting: HOSPITALIST
Payer: OTHER GOVERNMENT

## 2024-11-15 VITALS — SYSTOLIC BLOOD PRESSURE: 167 MMHG | DIASTOLIC BLOOD PRESSURE: 57 MMHG

## 2024-11-15 VITALS — WEIGHT: 164.91 LBS | BODY MASS INDEX: 28.15 KG/M2 | HEIGHT: 64 IN

## 2024-11-15 DIAGNOSIS — N18.6: ICD-10-CM

## 2024-11-15 DIAGNOSIS — J18.9: ICD-10-CM

## 2024-11-15 DIAGNOSIS — L89.152: ICD-10-CM

## 2024-11-15 DIAGNOSIS — N25.81: ICD-10-CM

## 2024-11-15 DIAGNOSIS — K44.9: ICD-10-CM

## 2024-11-15 DIAGNOSIS — Z85.118: ICD-10-CM

## 2024-11-15 DIAGNOSIS — M62.838: ICD-10-CM

## 2024-11-15 DIAGNOSIS — I12.0: ICD-10-CM

## 2024-11-15 DIAGNOSIS — L40.9: ICD-10-CM

## 2024-11-15 DIAGNOSIS — E83.39: ICD-10-CM

## 2024-11-15 DIAGNOSIS — C34.11: ICD-10-CM

## 2024-11-15 DIAGNOSIS — K21.9: ICD-10-CM

## 2024-11-15 DIAGNOSIS — C34.12: Primary | ICD-10-CM

## 2024-11-15 DIAGNOSIS — K58.1: ICD-10-CM

## 2024-11-15 DIAGNOSIS — Z87.891: ICD-10-CM

## 2024-11-15 DIAGNOSIS — Z87.19: ICD-10-CM

## 2024-11-15 DIAGNOSIS — Z79.899: ICD-10-CM

## 2024-11-15 DIAGNOSIS — E78.5: ICD-10-CM

## 2024-11-15 DIAGNOSIS — D63.1: ICD-10-CM

## 2024-11-15 DIAGNOSIS — I48.0: ICD-10-CM

## 2024-11-15 DIAGNOSIS — E86.0: ICD-10-CM

## 2024-11-15 DIAGNOSIS — J96.11: ICD-10-CM

## 2024-11-15 DIAGNOSIS — I25.10: ICD-10-CM

## 2024-11-15 DIAGNOSIS — E83.42: ICD-10-CM

## 2024-11-15 DIAGNOSIS — Z66: ICD-10-CM

## 2024-11-15 DIAGNOSIS — G47.33: ICD-10-CM

## 2024-11-15 DIAGNOSIS — Z98.890: ICD-10-CM

## 2024-11-15 DIAGNOSIS — Z79.82: ICD-10-CM

## 2024-11-15 DIAGNOSIS — M19.90: ICD-10-CM

## 2024-11-15 DIAGNOSIS — Z92.3: ICD-10-CM

## 2024-11-15 DIAGNOSIS — K75.9: ICD-10-CM

## 2024-11-15 DIAGNOSIS — M10.9: ICD-10-CM

## 2024-11-15 DIAGNOSIS — J44.9: ICD-10-CM

## 2024-11-15 DIAGNOSIS — G25.81: ICD-10-CM

## 2024-11-15 LAB
ALBUMIN SERPL BCP-MCNC: 2.8 G/DL (ref 3.4–5)
ALBUMIN/GLOB SERPL: 0.7 {RATIO} (ref 0.8–1.8)
ALT SERPL W P-5'-P-CCNC: 8 U/L (ref 12–78)
ANION GAP SERPL CALCULATED.4IONS-SCNC: 19 MMOL/L (ref 3–11)
AST SERPL W P-5'-P-CCNC: 13 U/L (ref 12–37)
BASOPHILS # BLD AUTO: 0.06 K/MM3 (ref 0–0.23)
BASOPHILS NFR BLD AUTO: 1 % (ref 0–2)
BILIRUB SERPL-MCNC: 0.3 MG/DL (ref 0.1–1)
BUN SERPL-MCNC: 107 MG/DL (ref 8–24)
CALCIUM SERPL-MCNC: 8.2 MG/DL (ref 8.5–10.1)
CHLORIDE SERPL-SCNC: 111 MMOL/L (ref 98–108)
CO2 SERPL-SCNC: 16 MMOL/L (ref 21–32)
CREAT SERPL-MCNC: 6.8 MG/DL (ref 0.6–1.2)
DEPRECATED RDW RBC AUTO: 54 FL (ref 35.1–46.3)
EOSINOPHIL # BLD AUTO: 0.05 K/MM3 (ref 0–0.68)
EOSINOPHIL NFR BLD AUTO: 0 % (ref 0–6)
ERYTHROCYTE [DISTWIDTH] IN BLOOD BY AUTOMATED COUNT: 17.1 % (ref 11.7–14.2)
GLOBULIN SER CALC-MCNC: 4 G/DL (ref 2.2–4)
GLUCOSE SERPL-MCNC: 151 MG/DL (ref 70–99)
HCT VFR BLD AUTO: 31.7 % (ref 37–53)
HGB BLD-MCNC: 9.5 G/DL (ref 13.5–17.5)
IMM GRANULOCYTES # BLD AUTO: 0.04 K/MM3 (ref 0–0.1)
IMM GRANULOCYTES NFR BLD AUTO: 0 % (ref 0–1)
LYMPHOCYTES # BLD AUTO: 0.42 K/MM3 (ref 0.84–5.2)
LYMPHOCYTES NFR BLD AUTO: 3 % (ref 21–46)
MCHC RBC AUTO-ENTMCNC: 30 G/DL (ref 31.5–36.5)
MCV RBC AUTO: 92 FL (ref 80–100)
MONOCYTES # BLD AUTO: 0.11 K/MM3 (ref 0.16–1.47)
MONOCYTES NFR BLD AUTO: 1 % (ref 4–13)
NEUTROPHILS # BLD AUTO: 12.41 K/MM3 (ref 1.96–9.15)
NEUTROPHILS NFR BLD AUTO: 95 % (ref 41–73)
NRBC # BLD AUTO: 0 K/MM3 (ref 0–0.02)
NRBC BLD AUTO-RTO: 0 /100 WBC (ref 0–0.2)
PLATELET # BLD AUTO: 322 K/MM3 (ref 150–400)
POTASSIUM SERPL-SCNC: 4.9 MMOL/L (ref 3.5–5.5)
PROT SERPL-MCNC: 6.8 G/DL (ref 6.4–8.2)
SODIUM SERPL-SCNC: 141 MMOL/L (ref 136–145)

## 2024-11-15 PROCEDURE — A9270 NON-COVERED ITEM OR SERVICE: HCPCS

## 2024-11-15 PROCEDURE — C1751 CATH, INF, PER/CENT/MIDLINE: HCPCS

## 2024-11-15 PROCEDURE — 5A09357 ASSISTANCE WITH RESPIRATORY VENTILATION, LESS THAN 24 CONSECUTIVE HOURS, CONTINUOUS POSITIVE AIRWAY PRESSURE: ICD-10-PCS | Performed by: FAMILY MEDICINE

## 2024-11-16 VITALS — SYSTOLIC BLOOD PRESSURE: 153 MMHG | DIASTOLIC BLOOD PRESSURE: 58 MMHG

## 2024-11-16 VITALS — DIASTOLIC BLOOD PRESSURE: 60 MMHG | SYSTOLIC BLOOD PRESSURE: 144 MMHG

## 2024-11-16 VITALS — SYSTOLIC BLOOD PRESSURE: 134 MMHG | DIASTOLIC BLOOD PRESSURE: 53 MMHG

## 2024-11-16 VITALS — SYSTOLIC BLOOD PRESSURE: 137 MMHG | DIASTOLIC BLOOD PRESSURE: 50 MMHG

## 2024-11-16 LAB
ALBUMIN SERPL BCP-MCNC: 2.8 G/DL (ref 3.4–5)
ANION GAP SERPL CALCULATED.4IONS-SCNC: 17 MMOL/L (ref 3–11)
BUN SERPL-MCNC: 110 MG/DL (ref 8–24)
CALCIUM SERPL-MCNC: 8.6 MG/DL (ref 8.5–10.1)
CHLORIDE SERPL-SCNC: 112 MMOL/L (ref 98–108)
CO2 SERPL-SCNC: 16 MMOL/L (ref 21–32)
CREAT SERPL-MCNC: 6.72 MG/DL (ref 0.6–1.2)
DEPRECATED RDW RBC AUTO: 52.7 FL (ref 35.1–46.3)
ERYTHROCYTE [DISTWIDTH] IN BLOOD BY AUTOMATED COUNT: 16.8 % (ref 11.7–14.2)
GLUCOSE SERPL-MCNC: 123 MG/DL (ref 70–99)
HCT VFR BLD AUTO: 29.6 % (ref 37–53)
HGB BLD-MCNC: 9.1 G/DL (ref 13.5–17.5)
MAGNESIUM SERPL-MCNC: 1.6 MG/DL (ref 1.6–2.4)
MCHC RBC AUTO-ENTMCNC: 30.7 G/DL (ref 31.5–36.5)
MCV RBC AUTO: 89 FL (ref 80–100)
NRBC # BLD AUTO: 0 K/MM3 (ref 0–0.02)
NRBC BLD AUTO-RTO: 0 /100 WBC (ref 0–0.2)
PHOSPHATE SERPL-MCNC: 5.4 MG/DL (ref 2.5–4.9)
PLATELET # BLD AUTO: 336 K/MM3 (ref 150–400)
POTASSIUM SERPL-SCNC: 5.1 MMOL/L (ref 3.5–5.5)
SODIUM SERPL-SCNC: 140 MMOL/L (ref 136–145)

## 2024-11-17 VITALS — DIASTOLIC BLOOD PRESSURE: 50 MMHG | SYSTOLIC BLOOD PRESSURE: 164 MMHG

## 2024-11-17 VITALS — DIASTOLIC BLOOD PRESSURE: 58 MMHG | SYSTOLIC BLOOD PRESSURE: 147 MMHG

## 2024-11-17 VITALS — SYSTOLIC BLOOD PRESSURE: 143 MMHG | DIASTOLIC BLOOD PRESSURE: 61 MMHG

## 2024-11-17 VITALS — DIASTOLIC BLOOD PRESSURE: 53 MMHG | SYSTOLIC BLOOD PRESSURE: 143 MMHG

## 2024-11-18 VITALS — SYSTOLIC BLOOD PRESSURE: 143 MMHG | DIASTOLIC BLOOD PRESSURE: 59 MMHG

## 2024-11-18 VITALS — DIASTOLIC BLOOD PRESSURE: 61 MMHG | SYSTOLIC BLOOD PRESSURE: 151 MMHG

## 2024-11-18 VITALS — DIASTOLIC BLOOD PRESSURE: 56 MMHG | SYSTOLIC BLOOD PRESSURE: 165 MMHG

## 2024-11-18 VITALS — DIASTOLIC BLOOD PRESSURE: 79 MMHG | SYSTOLIC BLOOD PRESSURE: 132 MMHG

## 2024-11-18 LAB
ALBUMIN SERPL BCP-MCNC: 2.4 G/DL (ref 3.4–5)
ALBUMIN SERPL BCP-MCNC: 2.5 G/DL (ref 3.4–5)
ALBUMIN/GLOB SERPL: 0.8 {RATIO} (ref 0.8–1.8)
ALT SERPL W P-5'-P-CCNC: 8 U/L (ref 12–78)
ANION GAP SERPL CALCULATED.4IONS-SCNC: 14 MMOL/L (ref 3–11)
ANION GAP SERPL CALCULATED.4IONS-SCNC: 17 MMOL/L (ref 3–11)
AST SERPL W P-5'-P-CCNC: 11 U/L (ref 12–37)
BASOPHILS # BLD AUTO: 0.06 K/MM3 (ref 0–0.23)
BASOPHILS NFR BLD AUTO: 1 % (ref 0–2)
BILIRUB SERPL-MCNC: 0.3 MG/DL (ref 0.1–1)
BUN SERPL-MCNC: 113 MG/DL (ref 8–24)
BUN SERPL-MCNC: 115 MG/DL (ref 8–24)
CALCIUM SERPL-MCNC: 8 MG/DL (ref 8.5–10.1)
CALCIUM SERPL-MCNC: 8.1 MG/DL (ref 8.5–10.1)
CHLORIDE SERPL-SCNC: 114 MMOL/L (ref 98–108)
CHLORIDE SERPL-SCNC: 115 MMOL/L (ref 98–108)
CO2 SERPL-SCNC: 16 MMOL/L (ref 21–32)
CO2 SERPL-SCNC: 19 MMOL/L (ref 21–32)
CREAT SERPL-MCNC: 6.86 MG/DL (ref 0.6–1.2)
CREAT SERPL-MCNC: 6.9 MG/DL (ref 0.6–1.2)
DEPRECATED RDW RBC AUTO: 55.5 FL (ref 35.1–46.3)
EOSINOPHIL # BLD AUTO: 0.27 K/MM3 (ref 0–0.68)
EOSINOPHIL NFR BLD AUTO: 3 % (ref 0–6)
ERYTHROCYTE [DISTWIDTH] IN BLOOD BY AUTOMATED COUNT: 17.4 % (ref 11.7–14.2)
GLOBULIN SER CALC-MCNC: 3.3 G/DL (ref 2.2–4)
GLUCOSE SERPL-MCNC: 110 MG/DL (ref 70–99)
GLUCOSE SERPL-MCNC: 131 MG/DL (ref 70–99)
HCT VFR BLD AUTO: 29.7 % (ref 37–53)
HGB BLD-MCNC: 9 G/DL (ref 13.5–17.5)
IMM GRANULOCYTES # BLD AUTO: 0.03 K/MM3 (ref 0–0.1)
IMM GRANULOCYTES NFR BLD AUTO: 0 % (ref 0–1)
LYMPHOCYTES # BLD AUTO: 1.51 K/MM3 (ref 0.84–5.2)
LYMPHOCYTES NFR BLD AUTO: 19 % (ref 21–46)
MAGNESIUM SERPL-MCNC: 1.5 MG/DL (ref 1.6–2.4)
MCHC RBC AUTO-ENTMCNC: 30.3 G/DL (ref 31.5–36.5)
MCV RBC AUTO: 90 FL (ref 80–100)
MONOCYTES # BLD AUTO: 0.65 K/MM3 (ref 0.16–1.47)
MONOCYTES NFR BLD AUTO: 8 % (ref 4–13)
NEUTROPHILS # BLD AUTO: 5.62 K/MM3 (ref 1.96–9.15)
NEUTROPHILS NFR BLD AUTO: 69 % (ref 41–73)
NRBC # BLD AUTO: 0 K/MM3 (ref 0–0.02)
NRBC BLD AUTO-RTO: 0 /100 WBC (ref 0–0.2)
PHOSPHATE SERPL-MCNC: 6.4 MG/DL (ref 2.5–4.9)
PHOSPHATE SERPL-MCNC: 7.2 MG/DL (ref 2.5–4.9)
PLATELET # BLD AUTO: 311 K/MM3 (ref 150–400)
POTASSIUM SERPL-SCNC: 4.7 MMOL/L (ref 3.5–5.5)
POTASSIUM SERPL-SCNC: 4.9 MMOL/L (ref 3.5–5.5)
PROT SERPL-MCNC: 5.8 G/DL (ref 6.4–8.2)
SODIUM SERPL-SCNC: 142 MMOL/L (ref 136–145)
SODIUM SERPL-SCNC: 143 MMOL/L (ref 136–145)

## 2024-11-19 VITALS — DIASTOLIC BLOOD PRESSURE: 60 MMHG | SYSTOLIC BLOOD PRESSURE: 148 MMHG

## 2024-11-19 VITALS — SYSTOLIC BLOOD PRESSURE: 165 MMHG | DIASTOLIC BLOOD PRESSURE: 54 MMHG

## 2024-11-19 VITALS — DIASTOLIC BLOOD PRESSURE: 66 MMHG | SYSTOLIC BLOOD PRESSURE: 159 MMHG

## 2024-11-19 VITALS — DIASTOLIC BLOOD PRESSURE: 60 MMHG | SYSTOLIC BLOOD PRESSURE: 162 MMHG

## 2024-11-19 LAB
ALBUMIN SERPL BCP-MCNC: 2.5 G/DL (ref 3.4–5)
ANION GAP SERPL CALCULATED.4IONS-SCNC: 16 MMOL/L (ref 3–11)
BASOPHILS # BLD AUTO: 0.05 K/MM3 (ref 0–0.23)
BASOPHILS NFR BLD AUTO: 1 % (ref 0–2)
BUN SERPL-MCNC: 111 MG/DL (ref 8–24)
CALCIUM SERPL-MCNC: 8.2 MG/DL (ref 8.5–10.1)
CHLORIDE SERPL-SCNC: 116 MMOL/L (ref 98–108)
CO2 SERPL-SCNC: 17 MMOL/L (ref 21–32)
CREAT SERPL-MCNC: 6.65 MG/DL (ref 0.6–1.2)
DEPRECATED RDW RBC AUTO: 56.2 FL (ref 35.1–46.3)
EOSINOPHIL # BLD AUTO: 0.22 K/MM3 (ref 0–0.68)
EOSINOPHIL NFR BLD AUTO: 3 % (ref 0–6)
ERYTHROCYTE [DISTWIDTH] IN BLOOD BY AUTOMATED COUNT: 17.2 % (ref 11.7–14.2)
GLUCOSE SERPL-MCNC: 113 MG/DL (ref 70–99)
HCT VFR BLD AUTO: 28.5 % (ref 37–53)
HGB BLD-MCNC: 8.5 G/DL (ref 13.5–17.5)
IMM GRANULOCYTES # BLD AUTO: 0.04 K/MM3 (ref 0–0.1)
IMM GRANULOCYTES NFR BLD AUTO: 1 % (ref 0–1)
LYMPHOCYTES # BLD AUTO: 1.49 K/MM3 (ref 0.84–5.2)
LYMPHOCYTES NFR BLD AUTO: 17 % (ref 21–46)
MAGNESIUM SERPL-MCNC: 1.6 MG/DL (ref 1.6–2.4)
MCHC RBC AUTO-ENTMCNC: 29.8 G/DL (ref 31.5–36.5)
MCV RBC AUTO: 91 FL (ref 80–100)
MONOCYTES # BLD AUTO: 0.71 K/MM3 (ref 0.16–1.47)
MONOCYTES NFR BLD AUTO: 8 % (ref 4–13)
NEUTROPHILS # BLD AUTO: 6.33 K/MM3 (ref 1.96–9.15)
NEUTROPHILS NFR BLD AUTO: 72 % (ref 41–73)
NRBC # BLD AUTO: 0 K/MM3 (ref 0–0.02)
NRBC BLD AUTO-RTO: 0 /100 WBC (ref 0–0.2)
PHOSPHATE SERPL-MCNC: 6 MG/DL (ref 2.5–4.9)
PLATELET # BLD AUTO: 293 K/MM3 (ref 150–400)
POTASSIUM SERPL-SCNC: 4.7 MMOL/L (ref 3.5–5.5)
SODIUM SERPL-SCNC: 144 MMOL/L (ref 136–145)

## 2024-11-20 VITALS — DIASTOLIC BLOOD PRESSURE: 66 MMHG | SYSTOLIC BLOOD PRESSURE: 138 MMHG

## 2024-11-20 VITALS — DIASTOLIC BLOOD PRESSURE: 60 MMHG | SYSTOLIC BLOOD PRESSURE: 148 MMHG

## 2024-11-20 VITALS — SYSTOLIC BLOOD PRESSURE: 155 MMHG | DIASTOLIC BLOOD PRESSURE: 56 MMHG

## 2024-11-20 VITALS — SYSTOLIC BLOOD PRESSURE: 178 MMHG | DIASTOLIC BLOOD PRESSURE: 63 MMHG

## 2024-11-20 VITALS — SYSTOLIC BLOOD PRESSURE: 159 MMHG | DIASTOLIC BLOOD PRESSURE: 64 MMHG

## 2024-11-20 VITALS — DIASTOLIC BLOOD PRESSURE: 61 MMHG | SYSTOLIC BLOOD PRESSURE: 159 MMHG

## 2024-11-20 VITALS — DIASTOLIC BLOOD PRESSURE: 56 MMHG | SYSTOLIC BLOOD PRESSURE: 140 MMHG

## 2024-11-20 VITALS — SYSTOLIC BLOOD PRESSURE: 138 MMHG | DIASTOLIC BLOOD PRESSURE: 57 MMHG

## 2024-11-20 VITALS — DIASTOLIC BLOOD PRESSURE: 60 MMHG | SYSTOLIC BLOOD PRESSURE: 160 MMHG

## 2024-11-20 VITALS — DIASTOLIC BLOOD PRESSURE: 53 MMHG | SYSTOLIC BLOOD PRESSURE: 133 MMHG

## 2024-11-20 VITALS — SYSTOLIC BLOOD PRESSURE: 158 MMHG | DIASTOLIC BLOOD PRESSURE: 67 MMHG

## 2024-11-20 VITALS — SYSTOLIC BLOOD PRESSURE: 167 MMHG | DIASTOLIC BLOOD PRESSURE: 66 MMHG

## 2024-11-20 VITALS — DIASTOLIC BLOOD PRESSURE: 59 MMHG | SYSTOLIC BLOOD PRESSURE: 137 MMHG

## 2024-11-20 VITALS — DIASTOLIC BLOOD PRESSURE: 54 MMHG | SYSTOLIC BLOOD PRESSURE: 168 MMHG

## 2024-11-20 VITALS — SYSTOLIC BLOOD PRESSURE: 163 MMHG | DIASTOLIC BLOOD PRESSURE: 57 MMHG

## 2024-11-20 VITALS — SYSTOLIC BLOOD PRESSURE: 142 MMHG | DIASTOLIC BLOOD PRESSURE: 62 MMHG

## 2024-11-20 VITALS — SYSTOLIC BLOOD PRESSURE: 156 MMHG | DIASTOLIC BLOOD PRESSURE: 63 MMHG

## 2024-11-20 VITALS — DIASTOLIC BLOOD PRESSURE: 63 MMHG | SYSTOLIC BLOOD PRESSURE: 169 MMHG

## 2024-11-20 LAB
ALBUMIN SERPL BCP-MCNC: 2.5 G/DL (ref 3.4–5)
ANION GAP SERPL CALCULATED.4IONS-SCNC: 13 MMOL/L (ref 3–11)
BUN SERPL-MCNC: 101 MG/DL (ref 8–24)
CALCIUM SERPL-MCNC: 8.4 MG/DL (ref 8.5–10.1)
CHLORIDE SERPL-SCNC: 116 MMOL/L (ref 98–108)
CO2 SERPL-SCNC: 19 MMOL/L (ref 21–32)
CREAT SERPL-MCNC: 5.98 MG/DL (ref 0.6–1.2)
GLUCOSE SERPL-MCNC: 115 MG/DL (ref 70–99)
HBV SURFACE AB SERPL IA-ACNC: <3.1 IU/L
HBV SURFACE AB SERPL IA-ACNC: <3.1 IU/L
HCT VFR BLD AUTO: 28.2 % (ref 37–53)
HGB BLD-MCNC: 8.4 G/DL (ref 13.5–17.5)
MAGNESIUM SERPL-MCNC: 1.6 MG/DL (ref 1.6–2.4)
PHOSPHATE SERPL-MCNC: 5.7 MG/DL (ref 2.5–4.9)
POTASSIUM SERPL-SCNC: 4.4 MMOL/L (ref 3.5–5.5)
SODIUM SERPL-SCNC: 144 MMOL/L (ref 136–145)

## 2024-11-20 PROCEDURE — 5A1D70Z PERFORMANCE OF URINARY FILTRATION, INTERMITTENT, LESS THAN 6 HOURS PER DAY: ICD-10-PCS | Performed by: FAMILY MEDICINE

## 2024-11-21 VITALS — DIASTOLIC BLOOD PRESSURE: 81 MMHG | SYSTOLIC BLOOD PRESSURE: 185 MMHG

## 2024-11-21 VITALS — DIASTOLIC BLOOD PRESSURE: 60 MMHG | SYSTOLIC BLOOD PRESSURE: 158 MMHG

## 2024-11-21 VITALS — DIASTOLIC BLOOD PRESSURE: 62 MMHG | SYSTOLIC BLOOD PRESSURE: 181 MMHG

## 2024-11-21 VITALS — DIASTOLIC BLOOD PRESSURE: 39 MMHG | SYSTOLIC BLOOD PRESSURE: 142 MMHG

## 2024-11-21 VITALS — DIASTOLIC BLOOD PRESSURE: 72 MMHG | SYSTOLIC BLOOD PRESSURE: 161 MMHG

## 2024-11-21 VITALS — SYSTOLIC BLOOD PRESSURE: 152 MMHG | DIASTOLIC BLOOD PRESSURE: 86 MMHG

## 2024-11-21 VITALS — DIASTOLIC BLOOD PRESSURE: 61 MMHG | SYSTOLIC BLOOD PRESSURE: 163 MMHG

## 2024-11-21 VITALS — SYSTOLIC BLOOD PRESSURE: 175 MMHG | DIASTOLIC BLOOD PRESSURE: 121 MMHG

## 2024-11-21 VITALS — SYSTOLIC BLOOD PRESSURE: 163 MMHG | DIASTOLIC BLOOD PRESSURE: 55 MMHG

## 2024-11-21 VITALS — SYSTOLIC BLOOD PRESSURE: 173 MMHG | DIASTOLIC BLOOD PRESSURE: 58 MMHG

## 2024-11-21 VITALS — DIASTOLIC BLOOD PRESSURE: 70 MMHG | SYSTOLIC BLOOD PRESSURE: 168 MMHG

## 2024-11-21 VITALS — DIASTOLIC BLOOD PRESSURE: 71 MMHG | SYSTOLIC BLOOD PRESSURE: 189 MMHG

## 2024-11-21 VITALS — DIASTOLIC BLOOD PRESSURE: 48 MMHG | SYSTOLIC BLOOD PRESSURE: 163 MMHG

## 2024-11-21 VITALS — DIASTOLIC BLOOD PRESSURE: 58 MMHG | SYSTOLIC BLOOD PRESSURE: 159 MMHG

## 2024-11-21 VITALS — DIASTOLIC BLOOD PRESSURE: 59 MMHG | SYSTOLIC BLOOD PRESSURE: 172 MMHG

## 2024-11-21 VITALS — DIASTOLIC BLOOD PRESSURE: 51 MMHG | SYSTOLIC BLOOD PRESSURE: 124 MMHG

## 2024-11-21 VITALS — DIASTOLIC BLOOD PRESSURE: 62 MMHG | SYSTOLIC BLOOD PRESSURE: 170 MMHG

## 2024-11-21 VITALS — DIASTOLIC BLOOD PRESSURE: 46 MMHG | SYSTOLIC BLOOD PRESSURE: 141 MMHG

## 2024-11-21 VITALS — DIASTOLIC BLOOD PRESSURE: 61 MMHG | SYSTOLIC BLOOD PRESSURE: 169 MMHG

## 2024-11-21 LAB
ALBUMIN SERPL BCP-MCNC: 2.3 G/DL (ref 3.4–5)
ANION GAP SERPL CALCULATED.4IONS-SCNC: 11 MMOL/L (ref 3–11)
BUN SERPL-MCNC: 55 MG/DL (ref 8–24)
CALCIUM SERPL-MCNC: 8 MG/DL (ref 8.5–10.1)
CHLORIDE SERPL-SCNC: 100 MMOL/L (ref 98–108)
CO2 SERPL-SCNC: 31 MMOL/L (ref 21–32)
CREAT SERPL-MCNC: 4.19 MG/DL (ref 0.6–1.2)
GAMMA INTERFERON BACKGROUND BLD IA-ACNC: 0.05 IU/ML
GLUCOSE SERPL-MCNC: 101 MG/DL (ref 70–99)
M TB IFN-G CD4+ BCKGRND COR BLD-ACNC: 0 IU/ML (ref ?–0.34)
M TB IFN-G CD4+CD8+ BCKGRND COR BLD-ACNC: 0 IU/ML (ref ?–0.34)
MAGNESIUM SERPL-MCNC: 1.6 MG/DL (ref 1.6–2.4)
MITOGEN IGNF BCKGRD COR BLD-ACNC: 9.06 IU/ML
PHOSPHATE SERPL-MCNC: 4 MG/DL (ref 2.5–4.9)
POTASSIUM SERPL-SCNC: 4 MMOL/L (ref 3.5–5.5)
SODIUM SERPL-SCNC: 138 MMOL/L (ref 136–145)

## 2024-11-22 VITALS — DIASTOLIC BLOOD PRESSURE: 68 MMHG | SYSTOLIC BLOOD PRESSURE: 143 MMHG

## 2024-11-22 VITALS — SYSTOLIC BLOOD PRESSURE: 153 MMHG | DIASTOLIC BLOOD PRESSURE: 61 MMHG

## 2024-11-22 VITALS — SYSTOLIC BLOOD PRESSURE: 151 MMHG | DIASTOLIC BLOOD PRESSURE: 54 MMHG

## 2024-11-22 VITALS — SYSTOLIC BLOOD PRESSURE: 138 MMHG | DIASTOLIC BLOOD PRESSURE: 63 MMHG

## 2024-11-22 VITALS — SYSTOLIC BLOOD PRESSURE: 118 MMHG | DIASTOLIC BLOOD PRESSURE: 72 MMHG

## 2024-11-22 VITALS — DIASTOLIC BLOOD PRESSURE: 57 MMHG | SYSTOLIC BLOOD PRESSURE: 162 MMHG

## 2024-11-22 VITALS — DIASTOLIC BLOOD PRESSURE: 61 MMHG | SYSTOLIC BLOOD PRESSURE: 179 MMHG

## 2024-11-22 VITALS — DIASTOLIC BLOOD PRESSURE: 66 MMHG | SYSTOLIC BLOOD PRESSURE: 184 MMHG

## 2024-11-22 VITALS — SYSTOLIC BLOOD PRESSURE: 167 MMHG | DIASTOLIC BLOOD PRESSURE: 146 MMHG

## 2024-11-22 VITALS — SYSTOLIC BLOOD PRESSURE: 177 MMHG | DIASTOLIC BLOOD PRESSURE: 58 MMHG

## 2024-11-22 VITALS — DIASTOLIC BLOOD PRESSURE: 70 MMHG | SYSTOLIC BLOOD PRESSURE: 172 MMHG

## 2024-11-22 VITALS — DIASTOLIC BLOOD PRESSURE: 65 MMHG | SYSTOLIC BLOOD PRESSURE: 173 MMHG

## 2024-11-22 VITALS — SYSTOLIC BLOOD PRESSURE: 148 MMHG | DIASTOLIC BLOOD PRESSURE: 53 MMHG

## 2024-11-22 VITALS — DIASTOLIC BLOOD PRESSURE: 52 MMHG | SYSTOLIC BLOOD PRESSURE: 174 MMHG

## 2024-11-22 VITALS — DIASTOLIC BLOOD PRESSURE: 58 MMHG | SYSTOLIC BLOOD PRESSURE: 172 MMHG

## 2024-11-22 VITALS — SYSTOLIC BLOOD PRESSURE: 164 MMHG | DIASTOLIC BLOOD PRESSURE: 60 MMHG

## 2024-11-26 ENCOUNTER — HOSPITAL ENCOUNTER (EMERGENCY)
Dept: HOSPITAL 95 - ER | Age: 84
Discharge: HOME | End: 2024-11-26
Payer: OTHER GOVERNMENT

## 2024-11-26 VITALS — HEIGHT: 70 IN | WEIGHT: 164.99 LBS | BODY MASS INDEX: 23.62 KG/M2

## 2024-11-26 VITALS — DIASTOLIC BLOOD PRESSURE: 60 MMHG | SYSTOLIC BLOOD PRESSURE: 150 MMHG

## 2024-11-26 DIAGNOSIS — Z79.82: ICD-10-CM

## 2024-11-26 DIAGNOSIS — J44.1: Primary | ICD-10-CM

## 2024-11-26 DIAGNOSIS — N18.6: ICD-10-CM

## 2024-11-26 DIAGNOSIS — Z79.899: ICD-10-CM

## 2024-11-26 DIAGNOSIS — C34.12: ICD-10-CM

## 2024-11-26 DIAGNOSIS — M10.9: ICD-10-CM

## 2024-11-26 DIAGNOSIS — I48.0: ICD-10-CM

## 2024-11-26 DIAGNOSIS — C34.11: ICD-10-CM

## 2024-11-26 DIAGNOSIS — E78.5: ICD-10-CM

## 2024-11-26 DIAGNOSIS — R33.9: ICD-10-CM

## 2024-11-26 DIAGNOSIS — J90: ICD-10-CM

## 2024-11-26 DIAGNOSIS — Z99.81: ICD-10-CM

## 2024-11-26 DIAGNOSIS — Z59.89: ICD-10-CM

## 2024-11-26 DIAGNOSIS — G47.33: ICD-10-CM

## 2024-11-26 DIAGNOSIS — I12.0: ICD-10-CM

## 2024-11-26 LAB
ALBUMIN SERPL BCP-MCNC: 2.7 G/DL (ref 3.4–5)
ALBUMIN/GLOB SERPL: 0.7 {RATIO} (ref 0.8–1.8)
ALT SERPL W P-5'-P-CCNC: 15 U/L (ref 12–78)
ANION GAP SERPL CALCULATED.4IONS-SCNC: 11 MMOL/L (ref 3–11)
AST SERPL W P-5'-P-CCNC: 27 U/L (ref 12–37)
BASOPHILS # BLD AUTO: 0.06 K/MM3 (ref 0–0.23)
BASOPHILS NFR BLD AUTO: 1 % (ref 0–2)
BILIRUB SERPL-MCNC: 0.3 MG/DL (ref 0.1–1)
BUN SERPL-MCNC: 30 MG/DL (ref 8–24)
CALCIUM SERPL-MCNC: 8.6 MG/DL (ref 8.5–10.1)
CHLORIDE SERPL-SCNC: 100 MMOL/L (ref 98–108)
CO2 SERPL-SCNC: 32 MMOL/L (ref 21–32)
CREAT SERPL-MCNC: 3.16 MG/DL (ref 0.6–1.2)
DEPRECATED RDW RBC AUTO: 58.6 FL (ref 35.1–46.3)
EOSINOPHIL # BLD AUTO: 0.17 K/MM3 (ref 0–0.68)
EOSINOPHIL NFR BLD AUTO: 2 % (ref 0–6)
ERYTHROCYTE [DISTWIDTH] IN BLOOD BY AUTOMATED COUNT: 17.4 % (ref 11.7–14.2)
GLOBULIN SER CALC-MCNC: 3.7 G/DL (ref 2.2–4)
GLUCOSE SERPL-MCNC: 99 MG/DL (ref 70–99)
HCT VFR BLD AUTO: 27.9 % (ref 37–53)
HGB BLD-MCNC: 8.3 G/DL (ref 13.5–17.5)
IMM GRANULOCYTES # BLD AUTO: 0.03 K/MM3 (ref 0–0.1)
IMM GRANULOCYTES NFR BLD AUTO: 0 % (ref 0–1)
LYMPHOCYTES # BLD AUTO: 0.72 K/MM3 (ref 0.84–5.2)
LYMPHOCYTES NFR BLD AUTO: 7 % (ref 21–46)
MCHC RBC AUTO-ENTMCNC: 29.7 G/DL (ref 31.5–36.5)
MCV RBC AUTO: 94 FL (ref 80–100)
MONOCYTES # BLD AUTO: 0.73 K/MM3 (ref 0.16–1.47)
MONOCYTES NFR BLD AUTO: 7 % (ref 4–13)
NEUTROPHILS # BLD AUTO: 8.26 K/MM3 (ref 1.96–9.15)
NEUTROPHILS NFR BLD AUTO: 83 % (ref 41–73)
NRBC # BLD AUTO: 0 K/MM3 (ref 0–0.02)
NRBC BLD AUTO-RTO: 0 /100 WBC (ref 0–0.2)
PLATELET # BLD AUTO: 296 K/MM3 (ref 150–400)
POTASSIUM SERPL-SCNC: 4.4 MMOL/L (ref 3.5–5.5)
PROT SERPL-MCNC: 6.4 G/DL (ref 6.4–8.2)
PROT UR STRIP-MCNC: (no result) MG/DL
SODIUM SERPL-SCNC: 139 MMOL/L (ref 136–145)
SP GR SPEC: 1.01 (ref 1–1.02)
UROBILINOGEN UR STRIP-MCNC: (no result) MG/DL

## 2024-11-26 PROCEDURE — A9270 NON-COVERED ITEM OR SERVICE: HCPCS

## 2024-11-26 SDOH — ECONOMIC STABILITY - INCOME SECURITY: OTHER PROBLEMS RELATED TO HOUSING AND ECONOMIC CIRCUMSTANCES: Z59.89

## 2025-06-23 ENCOUNTER — HOSPITAL ENCOUNTER (EMERGENCY)
Dept: HOSPITAL 95 - ER | Age: 85
LOS: 1 days | Discharge: NURSING, REHAB OR LONG-TERM CARE | End: 2025-06-24
Payer: OTHER GOVERNMENT

## 2025-06-23 VITALS — WEIGHT: 178 LBS | HEIGHT: 70 IN | BODY MASS INDEX: 25.48 KG/M2

## 2025-06-23 DIAGNOSIS — K21.9: ICD-10-CM

## 2025-06-23 DIAGNOSIS — M19.90: ICD-10-CM

## 2025-06-23 DIAGNOSIS — C34.90: ICD-10-CM

## 2025-06-23 DIAGNOSIS — E78.5: ICD-10-CM

## 2025-06-23 DIAGNOSIS — I12.0: ICD-10-CM

## 2025-06-23 DIAGNOSIS — Z79.82: ICD-10-CM

## 2025-06-23 DIAGNOSIS — J44.9: Primary | ICD-10-CM

## 2025-06-23 DIAGNOSIS — Z87.891: ICD-10-CM

## 2025-06-23 DIAGNOSIS — N18.6: ICD-10-CM

## 2025-06-23 DIAGNOSIS — Z79.899: ICD-10-CM

## 2025-06-23 DIAGNOSIS — G47.33: ICD-10-CM

## 2025-06-23 LAB
ALBUMIN SERPL BCP-MCNC: 3.1 G/DL (ref 3.4–5)
ALBUMIN/GLOB SERPL: 0.7 {RATIO} (ref 0.8–1.8)
ALP SERPL-CCNC: 81 U/L (ref 50–136)
ALT SERPL W P-5'-P-CCNC: 11 U/L (ref 12–78)
ANION GAP SERPL CALCULATED.4IONS-SCNC: 7 MMOL/L (ref 3–11)
AST SERPL W P-5'-P-CCNC: 19 U/L (ref 12–37)
BASOPHILS # BLD AUTO: 0.03 K/MM3 (ref 0–0.23)
BASOPHILS # BLD: (no result) K/MM3 (ref 0–0.23)
BASOPHILS NFR BLD AUTO: 0 % (ref 0–2)
BASOPHILS NFR BLD: (no result) % (ref 0–2)
BILIRUB SERPL-MCNC: 1.1 MG/DL (ref 0.1–1)
BLASTS NFR BLD MANUAL: (no result) % (ref 0–0)
BUN SERPL-MCNC: 37 MG/DL (ref 8–24)
BUN/CREAT SERPL: 10.6 % (ref 12–20)
CALCIUM SERPL-MCNC: 8.8 MG/DL (ref 8.5–10.1)
CHLORIDE SERPL-SCNC: 98 MMOL/L (ref 98–108)
CO2 SERPL-SCNC: 35 MMOL/L (ref 21–32)
CREAT SERPL-MCNC: 3.49 MG/DL (ref 0.6–1.2)
DEPRECATED RDW RBC AUTO: 59.2 FL (ref 35.1–46.3)
EOSINOPHIL # BLD AUTO: 0.18 K/MM3 (ref 0–0.68)
EOSINOPHIL # BLD: (no result) K/MM3 (ref 0–0.68)
EOSINOPHIL NFR BLD AUTO: 1 % (ref 0–6)
EOSINOPHIL NFR BLD: (no result) % (ref 0–6)
ERYTHROCYTE [DISTWIDTH] IN BLOOD BY AUTOMATED COUNT: 19.1 % (ref 11.7–14.2)
GFR SERPL CREATININE-BSD FRML MDRD: 16 ML/MIN/{1.73_M2} (ref 60–?)
GLOBULIN SER CALC-MCNC: 4.3 G/DL (ref 2.2–4)
GLUCOSE SERPL-MCNC: 109 MG/DL (ref 70–99)
HCT VFR BLD AUTO: 40.3 % (ref 37–53)
HGB BLD-MCNC: 12.1 G/DL (ref 13.5–17.5)
IMM GRANULOCYTES # BLD AUTO: 0.4 K/MM3 (ref 0–0.1)
IMM GRANULOCYTES NFR BLD AUTO: 3 % (ref 0–1)
LYMPHOCYTES # BLD AUTO: 0.85 K/MM3 (ref 0.84–5.2)
LYMPHOCYTES # BLD: (no result) K/MM3 (ref 0.84–5.2)
LYMPHOCYTES NFR BLD AUTO: 6 % (ref 21–46)
LYMPHOCYTES NFR BLD: (no result) % (ref 21–46)
MCH RBC QN AUTO: 26.2 PG (ref 26–34)
MCHC RBC AUTO-ENTMCNC: 30 G/DL (ref 31.5–36.5)
MCV RBC AUTO: 87 FL (ref 80–100)
METAMYELOCYTES # BLD MANUAL: (no result) K/MM3 (ref 0–0)
METAMYELOCYTES NFR BLD MANUAL: (no result) % (ref 0–0)
MONOCYTES # BLD AUTO: 0.74 K/MM3 (ref 0.16–1.47)
MONOCYTES # BLD: (no result) K/MM3 (ref 0.16–1.47)
MONOCYTES NFR BLD AUTO: 5 % (ref 4–13)
MONOCYTES NFR BLD: (no result) % (ref 4–13)
MYELOCYTES # BLD MANUAL: (no result) K/MM3 (ref 0–0)
MYELOCYTES NFR BLD MANUAL: (no result) % (ref 0–0)
NEUTROPHILS # BLD AUTO: 13.18 K/MM3 (ref 1.96–9.15)
NEUTROPHILS NFR BLD AUTO: 86 % (ref 41–73)
NEUTS BAND NFR BLD MANUAL: (no result) % (ref 0–8)
NEUTS SEG # BLD MANUAL: (no result) K/MM3 (ref 1.96–9.15)
NEUTS SEG NFR BLD MANUAL: (no result) % (ref 41–73)
NRBC # BLD AUTO: 0 K/MM3 (ref 0–0.02)
NRBC BLD AUTO-RTO: 0 /100 WBC (ref 0–0.2)
PLASMA CELLS # BLD MANUAL: (no result) K/MM3 (ref 0–0)
PLASMA CELLS NFR BLD: (no result) % (ref 0–0)
PLATELET # BLD AUTO: 375 K/MM3 (ref 150–400)
PMV BLD AUTO: 9.2 FL (ref 9.1–12.4)
POTASSIUM SERPL-SCNC: 4 MMOL/L (ref 3.5–5.5)
PROMYELOCYTES # BLD MANUAL: (no result) K/MM3 (ref 0–0)
PROMYELOCYTES NFR BLD MANUAL: (no result) % (ref 0–0)
PROT SERPL-MCNC: 7.4 G/DL (ref 6.4–8.2)
RBC # BLD AUTO: 4.62 M/MM3 (ref 4.3–5.9)
SODIUM SERPL-SCNC: 136 MMOL/L (ref 136–145)
TOTAL CELLS COUNTED BLD: (no result)
VARIANT LYMPHS NFR BLD MANUAL: (no result) % (ref 0–0)
WBC # BLD AUTO: 15.38 K/MM3 (ref 4–11.3)
WBC OTHER NFR BLD MANUAL: (no result) % (ref 0–0)

## 2025-06-23 PROCEDURE — A9270 NON-COVERED ITEM OR SERVICE: HCPCS

## 2025-06-24 VITALS — SYSTOLIC BLOOD PRESSURE: 161 MMHG | DIASTOLIC BLOOD PRESSURE: 48 MMHG
